# Patient Record
Sex: FEMALE | Race: WHITE | NOT HISPANIC OR LATINO
[De-identification: names, ages, dates, MRNs, and addresses within clinical notes are randomized per-mention and may not be internally consistent; named-entity substitution may affect disease eponyms.]

---

## 2019-03-04 ENCOUNTER — APPOINTMENT (OUTPATIENT)
Dept: OBGYN | Facility: CLINIC | Age: 44
End: 2019-03-04

## 2019-07-30 ENCOUNTER — APPOINTMENT (OUTPATIENT)
Dept: OBGYN | Facility: CLINIC | Age: 44
End: 2019-07-30
Payer: COMMERCIAL

## 2019-07-30 VITALS
BODY MASS INDEX: 24.07 KG/M2 | HEIGHT: 64 IN | SYSTOLIC BLOOD PRESSURE: 146 MMHG | WEIGHT: 141 LBS | DIASTOLIC BLOOD PRESSURE: 84 MMHG

## 2019-07-30 DIAGNOSIS — Z82.3 FAMILY HISTORY OF STROKE: ICD-10-CM

## 2019-07-30 DIAGNOSIS — Z12.31 ENCOUNTER FOR SCREENING MAMMOGRAM FOR MALIGNANT NEOPLASM OF BREAST: ICD-10-CM

## 2019-07-30 DIAGNOSIS — Z01.419 ENCOUNTER FOR GYNECOLOGICAL EXAMINATION (GENERAL) (ROUTINE) W/OUT ABNORMAL FINDINGS: ICD-10-CM

## 2019-07-30 DIAGNOSIS — Z78.9 OTHER SPECIFIED HEALTH STATUS: ICD-10-CM

## 2019-07-30 DIAGNOSIS — Z82.49 FAMILY HISTORY OF ISCHEMIC HEART DISEASE AND OTHER DISEASES OF THE CIRCULATORY SYSTEM: ICD-10-CM

## 2019-07-30 DIAGNOSIS — Z87.81 PERSONAL HISTORY OF (HEALED) TRAUMATIC FRACTURE: ICD-10-CM

## 2019-07-30 PROCEDURE — 99386 PREV VISIT NEW AGE 40-64: CPT

## 2019-07-30 NOTE — PHYSICAL EXAM
[Awake] : awake [Alert] : alert [Mass] : no breast mass [Acute Distress] : no acute distress [Axillary LAD] : no axillary lymphadenopathy [Soft] : soft [Nipple Discharge] : no nipple discharge [Tender] : non tender [Oriented x3] : oriented to person, place, and time [Normal] : uterus [No Bleeding] : there was no active vaginal bleeding [Uterine Adnexae] : were not tender and not enlarged [RRR, No Murmurs] : RRR, no murmurs [Occult Blood] : occult blood test from digital rectal exam was negative [CTAB] : CTAB [FreeTextEntry9] : small hemorrhoids

## 2019-08-02 LAB — HPV HIGH+LOW RISK DNA PNL CVX: DETECTED

## 2019-08-06 LAB — CYTOLOGY CVX/VAG DOC THIN PREP: ABNORMAL

## 2019-08-12 ENCOUNTER — FORM ENCOUNTER (OUTPATIENT)
Age: 44
End: 2019-08-12

## 2019-08-13 ENCOUNTER — OUTPATIENT (OUTPATIENT)
Dept: OUTPATIENT SERVICES | Facility: HOSPITAL | Age: 44
LOS: 1 days | End: 2019-08-13
Payer: COMMERCIAL

## 2019-08-13 ENCOUNTER — APPOINTMENT (OUTPATIENT)
Dept: MAMMOGRAPHY | Facility: CLINIC | Age: 44
End: 2019-08-13
Payer: COMMERCIAL

## 2019-08-13 DIAGNOSIS — Z90.721 ACQUIRED ABSENCE OF OVARIES, UNILATERAL: Chronic | ICD-10-CM

## 2019-08-13 DIAGNOSIS — Z12.31 ENCOUNTER FOR SCREENING MAMMOGRAM FOR MALIGNANT NEOPLASM OF BREAST: ICD-10-CM

## 2019-08-13 PROCEDURE — 77067 SCR MAMMO BI INCL CAD: CPT | Mod: 26

## 2019-08-13 PROCEDURE — 77063 BREAST TOMOSYNTHESIS BI: CPT | Mod: 26

## 2019-08-13 PROCEDURE — 77063 BREAST TOMOSYNTHESIS BI: CPT

## 2019-08-13 PROCEDURE — 77067 SCR MAMMO BI INCL CAD: CPT

## 2020-02-10 ENCOUNTER — APPOINTMENT (OUTPATIENT)
Dept: OBGYN | Facility: CLINIC | Age: 45
End: 2020-02-10
Payer: COMMERCIAL

## 2020-02-10 VITALS
HEIGHT: 64 IN | SYSTOLIC BLOOD PRESSURE: 123 MMHG | WEIGHT: 148 LBS | DIASTOLIC BLOOD PRESSURE: 82 MMHG | BODY MASS INDEX: 25.27 KG/M2

## 2020-02-10 DIAGNOSIS — N84.1 POLYP OF CERVIX UTERI: ICD-10-CM

## 2020-02-10 PROCEDURE — 57500 BIOPSY OF CERVIX: CPT

## 2020-02-10 PROCEDURE — 99213 OFFICE O/P EST LOW 20 MIN: CPT | Mod: 25

## 2020-02-10 NOTE — PHYSICAL EXAM
[Normal] : uterus [No Bleeding] : there was no active vaginal bleeding [Uterine Adnexae] : were not tender and not enlarged [Polyp ___ cm] : had a [unfilled] ~Ucm polyp

## 2020-02-11 LAB — HPV HIGH+LOW RISK DNA PNL CVX: NOT DETECTED

## 2020-02-13 LAB — CYTOLOGY CVX/VAG DOC THIN PREP: ABNORMAL

## 2020-02-26 LAB — CORE LAB BIOPSY: NORMAL

## 2021-03-16 ENCOUNTER — APPOINTMENT (OUTPATIENT)
Dept: OBGYN | Facility: CLINIC | Age: 46
End: 2021-03-16
Payer: COMMERCIAL

## 2021-03-16 VITALS
DIASTOLIC BLOOD PRESSURE: 60 MMHG | HEIGHT: 64 IN | BODY MASS INDEX: 25.27 KG/M2 | SYSTOLIC BLOOD PRESSURE: 110 MMHG | WEIGHT: 148 LBS

## 2021-03-16 PROCEDURE — 99396 PREV VISIT EST AGE 40-64: CPT

## 2021-03-16 PROCEDURE — 99072 ADDL SUPL MATRL&STAF TM PHE: CPT

## 2021-03-16 NOTE — PHYSICAL EXAM
[Awake] : awake [Alert] : alert [Acute Distress] : no acute distress [Mass] : no breast mass [Nipple Discharge] : no nipple discharge [Axillary LAD] : no axillary lymphadenopathy [Soft] : soft [Tender] : non tender [Oriented x3] : oriented to person, place, and time [Normal] : uterus [No Bleeding] : there was no active vaginal bleeding [Uterine Adnexae] : were not tender and not enlarged [RRR, No Murmurs] : RRR, no murmurs [CTAB] : CTAB [FreeTextEntry9] : small hemorrhoids

## 2021-03-19 LAB — HPV HIGH+LOW RISK DNA PNL CVX: DETECTED

## 2021-03-25 LAB — CYTOLOGY CVX/VAG DOC THIN PREP: ABNORMAL

## 2021-04-06 ENCOUNTER — RESULT CHARGE (OUTPATIENT)
Age: 46
End: 2021-04-06

## 2021-04-06 ENCOUNTER — APPOINTMENT (OUTPATIENT)
Dept: OBGYN | Facility: CLINIC | Age: 46
End: 2021-04-06
Payer: COMMERCIAL

## 2021-04-06 VITALS
SYSTOLIC BLOOD PRESSURE: 115 MMHG | WEIGHT: 148 LBS | HEIGHT: 64 IN | DIASTOLIC BLOOD PRESSURE: 74 MMHG | BODY MASS INDEX: 25.27 KG/M2

## 2021-04-06 PROCEDURE — 81025 URINE PREGNANCY TEST: CPT

## 2021-04-06 PROCEDURE — 99072 ADDL SUPL MATRL&STAF TM PHE: CPT

## 2021-04-06 PROCEDURE — 57454 BX/CURETT OF CERVIX W/SCOPE: CPT

## 2021-04-07 LAB
HCG UR QL: NEGATIVE
QUALITY CONTROL: YES

## 2021-04-07 NOTE — PROCEDURE
[Colposcopy] : Colposcopy  [Time out performed] : Pre-procedure time out performed.  Patient's name, date of birth and procedure confirmed. [Consent Obtained] : Consent obtained [Risks] : risks [Benefits] : benefits [Alternatives] : alternatives [Patient] : patient [Infection] : infection [Bleeding] : bleeding [Allergic Reaction] : allergic reaction [ASCUS] : ASCUS [Colposcopy Adequate] : colposcopy adequate [HPV High Risk] : HPV high risk [SCI Fully Visualized] : SCI fully visualized [ECC Performed] : ECC performed [Biopsy] : biopsy taken [Hemostasis Obtained] : Hemostasis obtained [Tolerated Well] : the patient tolerated the procedure well [de-identified] : 2 [de-identified] : 10,2. awe

## 2021-04-11 LAB — CORE LAB BIOPSY: NORMAL

## 2021-04-20 ENCOUNTER — APPOINTMENT (OUTPATIENT)
Dept: OBGYN | Facility: CLINIC | Age: 46
End: 2021-04-20
Payer: COMMERCIAL

## 2021-04-20 VITALS
HEART RATE: 59 BPM | BODY MASS INDEX: 24.92 KG/M2 | HEIGHT: 64 IN | DIASTOLIC BLOOD PRESSURE: 83 MMHG | SYSTOLIC BLOOD PRESSURE: 131 MMHG | WEIGHT: 146 LBS

## 2021-04-20 DIAGNOSIS — B97.7 PAPILLOMAVIRUS AS THE CAUSE OF DISEASES CLASSIFIED ELSEWHERE: ICD-10-CM

## 2021-04-20 PROCEDURE — 99072 ADDL SUPL MATRL&STAF TM PHE: CPT

## 2021-04-20 PROCEDURE — 56501 DESTROY VULVA LESIONS SIM: CPT

## 2021-04-20 NOTE — DISCUSSION/SUMMARY
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 dw pt trial of [facility]. \par tca applied to cervix. fu 1 yr given negative path.

## 2021-04-20 NOTE — HISTORY OF PRESENT ILLNESS
[FreeTextEntry1] : 47 yo pt here to fu colposcopy for ascus hrhpv+. path showed inflammation only. dw pt CC supplementation.

## 2021-11-03 ENCOUNTER — OUTPATIENT (OUTPATIENT)
Dept: OUTPATIENT SERVICES | Facility: HOSPITAL | Age: 46
LOS: 1 days | End: 2021-11-03
Payer: COMMERCIAL

## 2021-11-03 ENCOUNTER — RESULT REVIEW (OUTPATIENT)
Age: 46
End: 2021-11-03

## 2021-11-03 ENCOUNTER — APPOINTMENT (OUTPATIENT)
Dept: MAMMOGRAPHY | Facility: CLINIC | Age: 46
End: 2021-11-03
Payer: COMMERCIAL

## 2021-11-03 ENCOUNTER — TRANSCRIPTION ENCOUNTER (OUTPATIENT)
Age: 46
End: 2021-11-03

## 2021-11-03 DIAGNOSIS — Z12.31 ENCOUNTER FOR SCREENING MAMMOGRAM FOR MALIGNANT NEOPLASM OF BREAST: ICD-10-CM

## 2021-11-03 DIAGNOSIS — Z90.721 ACQUIRED ABSENCE OF OVARIES, UNILATERAL: Chronic | ICD-10-CM

## 2021-11-03 PROCEDURE — 77063 BREAST TOMOSYNTHESIS BI: CPT | Mod: 26

## 2021-11-03 PROCEDURE — 77067 SCR MAMMO BI INCL CAD: CPT | Mod: 26

## 2021-11-03 PROCEDURE — 77063 BREAST TOMOSYNTHESIS BI: CPT

## 2021-11-03 PROCEDURE — 77067 SCR MAMMO BI INCL CAD: CPT

## 2021-11-08 ENCOUNTER — RESULT REVIEW (OUTPATIENT)
Age: 46
End: 2021-11-08

## 2021-11-08 DIAGNOSIS — N64.89 OTHER SPECIFIED DISORDERS OF BREAST: ICD-10-CM

## 2021-12-03 ENCOUNTER — RESULT REVIEW (OUTPATIENT)
Age: 46
End: 2021-12-03

## 2021-12-03 ENCOUNTER — APPOINTMENT (OUTPATIENT)
Dept: MAMMOGRAPHY | Facility: CLINIC | Age: 46
End: 2021-12-03
Payer: COMMERCIAL

## 2021-12-03 ENCOUNTER — OUTPATIENT (OUTPATIENT)
Dept: OUTPATIENT SERVICES | Facility: HOSPITAL | Age: 46
LOS: 1 days | End: 2021-12-03
Payer: COMMERCIAL

## 2021-12-03 ENCOUNTER — APPOINTMENT (OUTPATIENT)
Dept: ULTRASOUND IMAGING | Facility: CLINIC | Age: 46
End: 2021-12-03
Payer: COMMERCIAL

## 2021-12-03 DIAGNOSIS — Z90.721 ACQUIRED ABSENCE OF OVARIES, UNILATERAL: Chronic | ICD-10-CM

## 2021-12-03 DIAGNOSIS — Z00.00 ENCOUNTER FOR GENERAL ADULT MEDICAL EXAMINATION WITHOUT ABNORMAL FINDINGS: ICD-10-CM

## 2021-12-03 DIAGNOSIS — N63.0 UNSPECIFIED LUMP IN UNSPECIFIED BREAST: ICD-10-CM

## 2021-12-03 PROCEDURE — 77065 DX MAMMO INCL CAD UNI: CPT | Mod: 26,RT

## 2021-12-03 PROCEDURE — G0279: CPT

## 2021-12-03 PROCEDURE — G0279: CPT | Mod: 26

## 2021-12-03 PROCEDURE — 76642 ULTRASOUND BREAST LIMITED: CPT | Mod: 26,RT

## 2021-12-03 PROCEDURE — 77065 DX MAMMO INCL CAD UNI: CPT

## 2021-12-03 PROCEDURE — 76642 ULTRASOUND BREAST LIMITED: CPT

## 2021-12-09 ENCOUNTER — OUTPATIENT (OUTPATIENT)
Dept: OUTPATIENT SERVICES | Facility: HOSPITAL | Age: 46
LOS: 1 days | End: 2021-12-09
Payer: COMMERCIAL

## 2021-12-09 ENCOUNTER — RESULT REVIEW (OUTPATIENT)
Age: 46
End: 2021-12-09

## 2021-12-09 ENCOUNTER — APPOINTMENT (OUTPATIENT)
Dept: ULTRASOUND IMAGING | Facility: CLINIC | Age: 46
End: 2021-12-09
Payer: COMMERCIAL

## 2021-12-09 DIAGNOSIS — Z00.8 ENCOUNTER FOR OTHER GENERAL EXAMINATION: ICD-10-CM

## 2021-12-09 DIAGNOSIS — Z90.721 ACQUIRED ABSENCE OF OVARIES, UNILATERAL: Chronic | ICD-10-CM

## 2021-12-09 PROCEDURE — 88305 TISSUE EXAM BY PATHOLOGIST: CPT | Mod: 26

## 2021-12-09 PROCEDURE — 19083 BX BREAST 1ST LESION US IMAG: CPT

## 2021-12-09 PROCEDURE — 77065 DX MAMMO INCL CAD UNI: CPT | Mod: 26,RT

## 2021-12-09 PROCEDURE — 19083 BX BREAST 1ST LESION US IMAG: CPT | Mod: RT

## 2021-12-09 PROCEDURE — A4648: CPT

## 2021-12-09 PROCEDURE — 77065 DX MAMMO INCL CAD UNI: CPT

## 2021-12-09 PROCEDURE — 88305 TISSUE EXAM BY PATHOLOGIST: CPT

## 2021-12-14 ENCOUNTER — NON-APPOINTMENT (OUTPATIENT)
Age: 46
End: 2021-12-14

## 2021-12-16 ENCOUNTER — APPOINTMENT (OUTPATIENT)
Dept: SURGERY | Facility: CLINIC | Age: 46
End: 2021-12-16
Payer: COMMERCIAL

## 2021-12-16 VITALS
BODY MASS INDEX: 32.86 KG/M2 | HEIGHT: 55 IN | OXYGEN SATURATION: 98 % | DIASTOLIC BLOOD PRESSURE: 88 MMHG | TEMPERATURE: 98.1 F | SYSTOLIC BLOOD PRESSURE: 160 MMHG | HEART RATE: 50 BPM | WEIGHT: 142 LBS

## 2021-12-16 PROCEDURE — 99205 OFFICE O/P NEW HI 60 MIN: CPT

## 2021-12-16 NOTE — ASSESSMENT
[FreeTextEntry1] : 47 yo presents with newly diagnosed 1.7 cm IDC grade 2 ER 90% FL >90% Her2 negative.  Recommendation for \par 1.  Follow up in 2 weeks\par 2.  MR breast \par 3.  Consult with medical oncology\par 4.  Consult with plastics\par 5.  Consult with radiation oncology\par 6.  Follow up genetics

## 2021-12-16 NOTE — PHYSICAL EXAM
[Normocephalic] : normocephalic [Atraumatic] : atraumatic [EOMI] : extra ocular movement intact [PERRL] : pupils equal, round and reactive to light [Sclera nonicteric] : sclera nonicteric [Supple] : supple [No Supraclavicular Adenopathy] : no supraclavicular adenopathy [Examined in the supine and seated position] : examined in the supine and seated position [No dominant masses] : no dominant masses in right breast  [No dominant masses] : no dominant masses left breast [No Nipple Retraction] : no left nipple retraction [No Nipple Discharge] : no left nipple discharge [Breast Nipple Inversion] : nipples not inverted [Breast Nipple Retraction] : nipples not retracted [Breast Nipple Flattening] : nipples not flattened [Breast Nipple Fissures] : nipples not fissured [Breast Abnormal Lactation (Galactorrhea)] : no galactorrhea [Breast Abnormal Secretion Bloody Fluid] : no bloody discharge [Breast Abnormal Secretion Serous Fluid] : no serous discharge [Breast Abnormal Secretion Opalescent Fluid] : no milky discharge [No Axillary Lymphadenopathy] : no left axillary lymphadenopathy [No Edema] : no edema [No Rashes] : no rashes [No Ulceration] : no ulceration [de-identified] : No supraclavicular or axillary adenopathy. No dominant masses, normal to palpation. Everted nipple without discharge. No skin changes.\par  [de-identified] : No supraclavicular or axillary adenopathy. No dominant masses, normal to palpation. Everted nipple without discharge. No skin changes.\par

## 2021-12-16 NOTE — HISTORY OF PRESENT ILLNESS
[FreeTextEntry1] : I had the pleasure of seeing BERNIE IVEY  in the office today for a new breast evaluation.\par \par Bernie was diagnsoed with right breast IDC grade 2 ER 90% UT >90% Her2 negative.  Screening imaging demonstrated a new questionable asymmetry in the right breast.  Call back mammogram recommended for biopsy of the 1.7 cm nodule.  Biopsy was performed on 12/9/2021 demonstrating IDC grade 2 ER 90% UT >90% Her2 negative,\par \par She denies dominant breast mass, skin changes or nipple discharge. She denies headaches, blurry vision, chest pain, SOB, abdominal pain, joint aches or difficult walking.\par \par 11/3/2021  [facility] mammo screen w gigi BI\par Questionable new asymmetry upper outer posterior right breast. Further evaluation with additional diagnostic mammographic views and ultrasound recommended.  RECOMMENDATION: Additional Imaging.\par BI-RADS 0 - Incomplete: Needs Additional Imaging Evaluation\par \par 12/3/2021  US breast limited RT/ MG mammo diag w gigi RT\par Suspicious mass right breast. Ultrasound guided core needle biopsy is recommended for further evaluation, and will be scheduled.\par Susana in the office of Dr. Velazquez was notified of these results on 12/3/2021\par RECOMMENDATION: Ultrasound biopsy. BI-RADS 5- Highly Suggestive for Malignancy\par \par 12/9/2021  Pathology\par Right breast at 10:00 o'clock, 6 cmfn from nipple, needle core biopsy:  Invasive mammary duct carcinoma, intermediate [place] modified histological grade 6/9 ( T=3, N=2, M=1), measuring 1.0 cm in biopsy length.  Ductal carcinoma in situ, is not present.  ER 90% UT >90% Her2 negative\par \par We reviewed and discussed current diagnosis of right breast cancer.  She understands that invasive breast cancer treatment options include surgery, radiation and/or chemotherapy/hormonal therapy.  \par \par We discussed surgical options including breast conservation with right wide lumpectomy and sentinel lymph node biopsy possible axillary dissection and the need for a tracer that would be injected into the breast; we discussed the possible options for the tracer of blue dye and/or nuclear medicine radiotracer, or another form such as magtrace to identify the sentinel nodes in a procedure termed "mapping".  We also discussed the option of right mastectomy with sentinel lymph node biopsy possible axillary dissection and the need for tracer to perform "mapping".  We discussed the rate of lymphedema with sentinel lymph node biopsy of approximately 7-10 % and 25-30% with axillary dissection.  We also discussed that in less than 2% of cases, mapping fails, requiring axillary dissection at the time of surgery. Furthermore, we discussed Z11 protocol for any individual undergoing breast conservation with a hormone positive tumor <5cm, sentinel lymph node biopsy will be performed and findings will be reported on final pathology to avoid reported micrometastasis, or isolated tumor cells as a positive node intra-operatively that would not necessitate completion axillary dissection.\par \par In terms of the differences between breast conservation or mastectomy, we reviewed that overall survival is equivalent, and locoregional recurrence is higher with breast conservation.  She understands that since locoregional recurrence is higher with breast conservation, radiation is recommended as standard of care for every patient <70 years old.  We discussed radiation as high energy directed toward the breast, generally Mon-Fri for 6.5 weeks unless a different duration is discussed with Radiation Oncology. We discussed risks v benefits of radiation and usually benign able to omit radiation with mastectomy unless in such cases as inflammatory breast cancer, positive margins after mastectomy, tumor >4cm. A separate consultation has been recommended with our multidisciplinary radiation partners to discuss further.\par \par We discussed the technical aspects of each surgical procedure in addition to technical aspects of radiation.  We reviewed risks involved with radiation including but not limited to infection, hyperpigmentation, angiosarcoma, radiation induced carditis/cardiomyopathy, and injury to the lung.  We also discussed breast lymphedema and radiation induced changes of the skin/tissue.\par \par We also discussed the difference between the biology of cancer, and reviewed the particular biology identified on pathology and the significance in terms of treatment. We discussed Oncotype/recurrence testing and need for chemotherapy for hormone positive cancers and the timing of systemic treatment prior to radiation treatment. \par \par \par We also discussed options of reconstruction.  Plastic Surgery consultation is recommended, in addition to consult with radiation oncology. \par \par We offered assistance in scheduling all multi-disciplinary appointments and imaging. We also assist in scheduling all appointments needed for medical clearance and presurgical testing. \par \par We discussed the benefit of the  who assists patients within the Cancer Center, as well as coalition support. We have also discussed nutritional support, genetic counseling and physical therapy services we offer.\par \par \par She understands the plan and all questions were answered.\par

## 2021-12-20 ENCOUNTER — APPOINTMENT (OUTPATIENT)
Dept: MRI IMAGING | Facility: CLINIC | Age: 46
End: 2021-12-20
Payer: COMMERCIAL

## 2021-12-20 ENCOUNTER — RESULT REVIEW (OUTPATIENT)
Age: 46
End: 2021-12-20

## 2021-12-20 ENCOUNTER — OUTPATIENT (OUTPATIENT)
Dept: OUTPATIENT SERVICES | Facility: HOSPITAL | Age: 46
LOS: 1 days | End: 2021-12-20
Payer: COMMERCIAL

## 2021-12-20 ENCOUNTER — APPOINTMENT (OUTPATIENT)
Dept: PLASTIC SURGERY | Facility: CLINIC | Age: 46
End: 2021-12-20
Payer: COMMERCIAL

## 2021-12-20 VITALS — BODY MASS INDEX: 24.75 KG/M2 | HEIGHT: 64 IN | WEIGHT: 145 LBS

## 2021-12-20 DIAGNOSIS — C50.911 MALIGNANT NEOPLASM OF UNSPECIFIED SITE OF RIGHT FEMALE BREAST: ICD-10-CM

## 2021-12-20 DIAGNOSIS — Z90.721 ACQUIRED ABSENCE OF OVARIES, UNILATERAL: Chronic | ICD-10-CM

## 2021-12-20 DIAGNOSIS — Z80.3 FAMILY HISTORY OF MALIGNANT NEOPLASM OF BREAST: ICD-10-CM

## 2021-12-20 PROCEDURE — C8908: CPT

## 2021-12-20 PROCEDURE — A9585: CPT

## 2021-12-20 PROCEDURE — C8937: CPT

## 2021-12-20 PROCEDURE — 99205 OFFICE O/P NEW HI 60 MIN: CPT

## 2021-12-20 PROCEDURE — 77049 MRI BREAST C-+ W/CAD BI: CPT | Mod: 26

## 2021-12-21 ENCOUNTER — APPOINTMENT (OUTPATIENT)
Dept: RADIATION ONCOLOGY | Facility: CLINIC | Age: 46
End: 2021-12-21
Payer: COMMERCIAL

## 2021-12-21 VITALS
OXYGEN SATURATION: 99 % | WEIGHT: 143 LBS | TEMPERATURE: 97 F | SYSTOLIC BLOOD PRESSURE: 140 MMHG | HEIGHT: 64 IN | HEART RATE: 59 BPM | BODY MASS INDEX: 24.41 KG/M2 | RESPIRATION RATE: 16 BRPM | DIASTOLIC BLOOD PRESSURE: 70 MMHG

## 2021-12-21 VITALS — HEIGHT: 64 IN

## 2021-12-21 PROCEDURE — 99205 OFFICE O/P NEW HI 60 MIN: CPT | Mod: 25

## 2021-12-21 NOTE — DISEASE MANAGEMENT
[Clinical] : TNM Stage: c [IA] : IA [FreeTextEntry4] : right breast IDC, grade 2, ER+/UT+, HER2- [TTNM] : 1c [NTNM] : 0 [MTNM] : 0

## 2021-12-21 NOTE — LETTER CLOSING
[Consult Closing:] : Thank you for allowing me to participate in the care of this patient.  If you have any questions, please do not hesitate to contact me. [Sincerely yours,] : Sincerely yours, [FreeTextEntry3] : Hermann Menon MD\par  of Radiation Medicine, Quality and Safety\par  of Radiation Medicine\par [facility]\par [facility]\par [facility]\par

## 2021-12-21 NOTE — HISTORY OF PRESENT ILLNESS
[FreeTextEntry1] : 46-year-old woman presents today for consultation regarding radiation therapy for breast cancer.  \par \par She undergoes routine breast imaging, and was noted on 11/3/21 bilateral screening mammogram to have questionable new asymmetry upper outer posterior right breast. BI-RADS 0.\par \par 12/30/21 diagnostic right mammogram and targeted breast ultrasound noted a persistent asymmetry in the right breast UOQ, with corresponding 1.7 cm irregular hypoechoic mass at 10:00 6cm FN.\par \par 12/9/21 ultrasound guided needle core biopsy showed invasive mammary duct carcinoma, grade 2, ER 90%, DE >90%, HER2 negative.  DCIS was not present.  No LVI identified.\par \par She met with Dr. Packer on 12/16/21, and with Dr. Duncan on 12/20/21.\par \par She underwent MRI breast on 12/20/21, report pending.\par \par Denies pain, edema, itch, nipple discharge, arm edema, fatigue or unintentional weight loss.

## 2021-12-21 NOTE — VITALS
[Maximal Pain Intensity: 0/10] : 0/10 [Least Pain Intensity: 0/10] : 0/10 [90: Able to carry normal activity; minor signs or symptoms of disease.] : 90: Able to carry normal activity; minor signs or symptoms of disease.  [Date: ____________] : Patient's last distress assessment performed on [unfilled]. [10 - Extreme Distress] : Distress Level: 10 [Patient given social work contact information and resource sheet] : Patient was given social work contact information and resource sheet [FreeTextEntry7] : did not want to speak to social work

## 2021-12-21 NOTE — REVIEW OF SYSTEMS
[Negative] : Allergic/Immunologic [Edema Limbs: Grade 0] : Edema Limbs: Grade 0  [Fatigue: Grade 0] : Fatigue: Grade 0 [Localized Edema: Grade 0] : Localized Edema: Grade 0  [Neck Edema: Grade 0] : Neck Edema: Grade 0 [Dyspareunia: Grade 0] : Dyspareunia: Grade 0

## 2021-12-21 NOTE — PHYSICAL EXAM
[Sclera] : the sclera and conjunctiva were normal [Extraocular Movements] : extraocular movements were intact [Outer Ear] : the ears and nose were normal in appearance [No UE Edema] : there is no upper extremity edema [Bowel Sounds] : normal bowel sounds [Abdomen Soft] : soft [Nondistended] : nondistended [Abdomen Tenderness] : non-tender [Cervical Lymph Nodes Enlarged Anterior Bilaterally] : anterior cervical [Supraclavicular Lymph Nodes Enlarged Bilaterally] : supraclavicular [Axillary Lymph Nodes Enlarged Bilaterally] : axillary [Range of Motion to Joints] : range of motion to joints [Motor Tone] : muscle strength and tone were normal [Skin Lesions] : no skin lesions [Normal] : no focal deficits [Motor Exam] : the motor exam was normal [de-identified] : post-biopsy ecchymosis

## 2021-12-29 ENCOUNTER — OUTPATIENT (OUTPATIENT)
Dept: OUTPATIENT SERVICES | Facility: HOSPITAL | Age: 46
LOS: 1 days | Discharge: HOME | End: 2021-12-29

## 2021-12-29 ENCOUNTER — NON-APPOINTMENT (OUTPATIENT)
Age: 46
End: 2021-12-29

## 2021-12-29 ENCOUNTER — APPOINTMENT (OUTPATIENT)
Dept: HEMATOLOGY ONCOLOGY | Facility: CLINIC | Age: 46
End: 2021-12-29
Payer: COMMERCIAL

## 2021-12-29 VITALS
BODY MASS INDEX: 24.59 KG/M2 | OXYGEN SATURATION: 98 % | HEART RATE: 49 BPM | WEIGHT: 144 LBS | SYSTOLIC BLOOD PRESSURE: 147 MMHG | DIASTOLIC BLOOD PRESSURE: 86 MMHG | HEIGHT: 64 IN

## 2021-12-29 DIAGNOSIS — C50.919 MALIGNANT NEOPLASM OF UNSPECIFIED SITE OF UNSPECIFIED FEMALE BREAST: ICD-10-CM

## 2021-12-29 DIAGNOSIS — Z90.721 ACQUIRED ABSENCE OF OVARIES, UNILATERAL: Chronic | ICD-10-CM

## 2021-12-29 PROCEDURE — 99205 OFFICE O/P NEW HI 60 MIN: CPT

## 2021-12-29 NOTE — CONSULT LETTER
[Dear  ___] : Dear  [unfilled], [Consult Letter:] : I had the pleasure of evaluating your patient, [unfilled]. [Please see my note below.] : Please see my note below. [Consult Closing:] : Thank you very much for allowing me to participate in the care of this patient.  If you have any questions, please do not hesitate to contact me. [Sincerely,] : Sincerely, [DrSylvester  ___] : Dr. HATCH [FreeTextEntry3] : Diane Rubio MD\par Medical Oncology/Hematology\par [facility] [place], [facility]\par \par \par [facility]\par

## 2021-12-29 NOTE — HISTORY OF PRESENT ILLNESS
[de-identified] : Ms. Harrington was diagnosed with breast cancer at age 46 in December 2021.\par \par She had a screening mammogram on 11/3/21 which showed a questionable new asymmetry  upper outer posterior right breast. \par Subsequent diagnostic mammogram/sonogram on 12/3/21 showed: 1.2 x 1.2 x 1.7 cm irregular mass at 10:00 right breast, 6 cmfn. No axillary adenopathy.\par \par On 12/9/21 right breast 10:00, 6 cmfn core biposy - IDC, grade 6/9, 1 cm in length. ER 90%, AK>90%, HER2 negative. \par \par On 12/20/21 MRI breast -Marked background parenchymal enhancement significantly lowers the sensitivity of breast MRI for detection of small enhancing lesions.\par A 2.1 cm biopsy-proven malignancy in the posterior 10:00 RIGHT breast. Additionally, there is suspicious asymmetric nonmass enhancement involving most of the upper right breast including the site of biopsy-proven malignancy in the upper outer breast and the upper inner quadrant. MR guided biopsy of a representative region in the upper inner quadrant is recommended ([id number]).\par No lymphadenopathy and no MR evidence of malignancy in the contralateral breast.\par \par PMHx: Denies \par FMHx: Maternal Great Aunt breast Cancer, Father prostate cancer dx early 60's \par Sx: Cystectomy  \par Socx: Social Alcohol Use, Non-smoker \par PCP: Dr. Yung Beverly in [place] \par \par Planning on b/l mastectomy\par LMS 12/26/2021\par GYN Dr. Bass, pap smear - 04/2021 \par Notes she has 3 children.

## 2021-12-29 NOTE — PHYSICAL EXAM
[Normal] : affect appropriate [de-identified] : supple [de-identified] : c [de-identified] : no palpable R breast mass or axillary adenopathy. +ecchymosis at site of biopsy R breast.

## 2021-12-29 NOTE — ASSESSMENT
[FreeTextEntry1] : 46 year old premenopausal female with right breast invasive ductal carcinoma, ER 90%, MS>90%, HER2 negative. \par MRI breast shows 2.1 cm 10:00 R breast mass and asymmetric nonmass enhancement in involving site of disease and upper inner quadrant.\par \par Today we discussed the available radiographic and pathologic data in detail.  We also discussed the natural history of the disease, as well as management options. Discussed role of on oncotype Dx in guiding decisions about adjuvant chemotherapy.   Also discussed role of adjuvant endocrine therapy in reducing risk of distant recurrence. Briefly discussed Tamoxifen vs. OS+AI if higher risk disease.  She is considering a bilateral mastectomy. \par \par She will return for follow up 3-4 weeks after surgery to finalize a treatment plan.

## 2021-12-29 NOTE — ADDENDUM
[FreeTextEntry1] : Documented by Vini Granados acting as scribe for Dr. Rubio on 12/29/2021. \par \par All Medical record entries made by the Scribe were at my, Dr. Rubio's, direction and personally dictated by me on 12/29/2021. I have reviewed the chart and agree that the record accurately reflects my personal performance of the history, physical exam, assessment and plan. I have also personally directed, reviewed, and agreed with the discharge instructions.

## 2021-12-30 ENCOUNTER — APPOINTMENT (OUTPATIENT)
Dept: SURGERY | Facility: CLINIC | Age: 46
End: 2021-12-30
Payer: COMMERCIAL

## 2021-12-30 ENCOUNTER — APPOINTMENT (OUTPATIENT)
Dept: MRI IMAGING | Facility: CLINIC | Age: 46
End: 2021-12-30
Payer: COMMERCIAL

## 2021-12-30 VITALS
HEART RATE: 48 BPM | WEIGHT: 144 LBS | OXYGEN SATURATION: 98 % | BODY MASS INDEX: 24.59 KG/M2 | SYSTOLIC BLOOD PRESSURE: 147 MMHG | DIASTOLIC BLOOD PRESSURE: 86 MMHG | HEIGHT: 64 IN | TEMPERATURE: 98 F

## 2021-12-30 PROCEDURE — 99215 OFFICE O/P EST HI 40 MIN: CPT

## 2021-12-30 PROCEDURE — 74185 MRA ABD W OR W/O CNTRST: CPT

## 2021-12-30 PROCEDURE — A9585: CPT | Mod: NC

## 2021-12-30 PROCEDURE — 72198 MR ANGIO PELVIS W/O & W/DYE: CPT

## 2021-12-30 NOTE — PHYSICAL EXAM
[Normocephalic] : normocephalic [Atraumatic] : atraumatic [EOMI] : extra ocular movement intact [PERRL] : pupils equal, round and reactive to light [Sclera nonicteric] : sclera nonicteric [Supple] : supple [No Supraclavicular Adenopathy] : no supraclavicular adenopathy [Examined in the supine and seated position] : examined in the supine and seated position [No dominant masses] : no dominant masses in right breast  [No dominant masses] : no dominant masses left breast [No Nipple Retraction] : no left nipple retraction [No Nipple Discharge] : no left nipple discharge [Breast Nipple Inversion] : nipples not inverted [Breast Nipple Retraction] : nipples not retracted [Breast Nipple Flattening] : nipples not flattened [Breast Nipple Fissures] : nipples not fissured [Breast Abnormal Lactation (Galactorrhea)] : no galactorrhea [Breast Abnormal Secretion Bloody Fluid] : no bloody discharge [Breast Abnormal Secretion Serous Fluid] : no serous discharge [Breast Abnormal Secretion Opalescent Fluid] : no milky discharge [No Axillary Lymphadenopathy] : no left axillary lymphadenopathy [No Edema] : no edema [No Rashes] : no rashes [No Ulceration] : no ulceration [de-identified] : No supraclavicular or axillary adenopathy. No dominant masses, normal to palpation. Everted nipple without discharge. No skin changes.\par  [de-identified] : No supraclavicular or axillary adenopathy. No dominant masses, normal to palpation. Everted nipple without discharge. No skin changes.\par

## 2021-12-30 NOTE — HISTORY OF PRESENT ILLNESS
[FreeTextEntry1] : I had the pleasure of seeing BERNIE IVEY  in the office today to discuss surgical planning.\par \par Bernie was diagnsoed with right breast IDC grade 2 ER 90% DE >90% Her2 negative.  Screening imaging demonstrated a new questionable asymmetry in the right breast.  Call back mammogram recommended for biopsy of the 1.7 cm nodule.  Biopsy was performed on 12/9/2021 demonstrating IDC grade 2 ER 90% DE >90% Her2 negative.\par \par She denies dominant breast mass, skin changes or nipple discharge. She denies headaches, blurry vision, chest pain, SOB, abdominal pain, joint aches or difficult walking.\par \par 11/3/2021  [facility] mammo screen w gigi BI\par Questionable new asymmetry upper outer posterior right breast. Further evaluation with additional diagnostic mammographic views and ultrasound recommended.  RECOMMENDATION: Additional Imaging.\par BI-RADS 0 - Incomplete: Needs Additional Imaging Evaluation\par \par 12/3/2021  US breast limited RT/ MG mammo diag w gigi RT\par Suspicious mass right breast. Ultrasound guided core needle biopsy is recommended for further evaluation, and will be scheduled.\par Susana in the office of Dr. Velazquez was notified of these results on 12/3/2021\par RECOMMENDATION: Ultrasound biopsy. BI-RADS 5- Highly Suggestive for Malignancy\par \par 12/9/2021  Pathology\par Right breast at 10:00 o'clock, 6 cmfn from nipple, needle core biopsy:  Invasive mammary duct carcinoma, intermediate [place] modified histological grade 6/9 ( T=3, N=2, M=1), measuring 1.0 cm in biopsy length.  Ductal carcinoma in situ, is not present.  ER 90% DE >90% Her2 negative\par \par 12/21/2021  MR breast \par Marked background parenchymal enhancement significantly lowers the sensitivity of breast MRI for detection of small enhancing lesions.\par A 2.1 cm biopsy-proven malignancy in the posterior 10:00 RIGHT breast. Additionally, there is suspicious asymmetric nonmass enhancement involving most of the upper right breast including the site of biopsy-proven malignancy in the upper outer breast and the upper inner quadrant. MR guided biopsy of a representative region in the upper inner quadrant is recommended ([id number]).\par No lymphadenopathy and no MR evidence of malignancy in the contralateral breast.\par RECOMMENDATION: MR guided biopsy.\par BI-RADS 4B - Suspicious Finding(s) - Moderate Suspicion for Malignancy\par \par We reviewed and discussed MR breast and she understands she does not need to undergo additional biopsy since it will not alter her management since she is undergoing bilateral nipple/skin sparing mastectomy with SLNB possible AND.  I reviewed her genetics and her genetics are negative for mutation.  Plan for bilateral nipple/skin sparing mastectomy with SLND possible AND.\par \par She understand and agrees to plan.  All questions answered.\par

## 2022-01-26 ENCOUNTER — OUTPATIENT (OUTPATIENT)
Dept: OUTPATIENT SERVICES | Facility: HOSPITAL | Age: 47
LOS: 1 days | End: 2022-01-26
Payer: COMMERCIAL

## 2022-01-26 VITALS
WEIGHT: 140.21 LBS | HEIGHT: 64 IN | RESPIRATION RATE: 16 BRPM | TEMPERATURE: 98 F | OXYGEN SATURATION: 100 % | HEART RATE: 55 BPM | DIASTOLIC BLOOD PRESSURE: 84 MMHG | SYSTOLIC BLOOD PRESSURE: 131 MMHG

## 2022-01-26 DIAGNOSIS — C50.911 MALIGNANT NEOPLASM OF UNSPECIFIED SITE OF RIGHT FEMALE BREAST: ICD-10-CM

## 2022-01-26 DIAGNOSIS — Z90.721 ACQUIRED ABSENCE OF OVARIES, UNILATERAL: Chronic | ICD-10-CM

## 2022-01-26 LAB
ANION GAP SERPL CALC-SCNC: 5 MMOL/L — SIGNIFICANT CHANGE UP (ref 5–17)
BLD GP AB SCN SERPL QL: SIGNIFICANT CHANGE UP
BUN SERPL-MCNC: 12 MG/DL — SIGNIFICANT CHANGE UP (ref 7–23)
CALCIUM SERPL-MCNC: 8.8 MG/DL — SIGNIFICANT CHANGE UP (ref 8.4–10.5)
CHLORIDE SERPL-SCNC: 105 MMOL/L — SIGNIFICANT CHANGE UP (ref 96–108)
CO2 SERPL-SCNC: 30 MMOL/L — SIGNIFICANT CHANGE UP (ref 22–31)
CREAT SERPL-MCNC: 0.84 MG/DL — SIGNIFICANT CHANGE UP (ref 0.5–1.3)
GLUCOSE SERPL-MCNC: 97 MG/DL — SIGNIFICANT CHANGE UP (ref 70–99)
HCT VFR BLD CALC: 38.6 % — SIGNIFICANT CHANGE UP (ref 34.5–45)
HGB BLD-MCNC: 12.8 G/DL — SIGNIFICANT CHANGE UP (ref 11.5–15.5)
MCHC RBC-ENTMCNC: 30.3 PG — SIGNIFICANT CHANGE UP (ref 27–34)
MCHC RBC-ENTMCNC: 33.2 GM/DL — SIGNIFICANT CHANGE UP (ref 32–36)
MCV RBC AUTO: 91.5 FL — SIGNIFICANT CHANGE UP (ref 80–100)
NRBC # BLD: 0 /100 WBCS — SIGNIFICANT CHANGE UP (ref 0–0)
PLATELET # BLD AUTO: 231 K/UL — SIGNIFICANT CHANGE UP (ref 150–400)
POTASSIUM SERPL-MCNC: 3.9 MMOL/L — SIGNIFICANT CHANGE UP (ref 3.5–5.3)
POTASSIUM SERPL-SCNC: 3.9 MMOL/L — SIGNIFICANT CHANGE UP (ref 3.5–5.3)
RBC # BLD: 4.22 M/UL — SIGNIFICANT CHANGE UP (ref 3.8–5.2)
RBC # FLD: 13.3 % — SIGNIFICANT CHANGE UP (ref 10.3–14.5)
SODIUM SERPL-SCNC: 140 MMOL/L — SIGNIFICANT CHANGE UP (ref 135–145)
WBC # BLD: 6.55 K/UL — SIGNIFICANT CHANGE UP (ref 3.8–10.5)
WBC # FLD AUTO: 6.55 K/UL — SIGNIFICANT CHANGE UP (ref 3.8–10.5)

## 2022-01-26 NOTE — H&P PST ADULT - NSICDXFAMILYHX_GEN_ALL_CORE_FT
FAMILY HISTORY:  Father  Still living? Yes, Estimated age: 61-70  Family history of acute myocardial infarction, Age at diagnosis: 51-60  Family history of prostate cancer, Age at diagnosis: Age Unknown    Mother  Still living? Unknown  FH: HTN (hypertension), Age at diagnosis: Age Unknown    Sibling  Still living? Yes, Estimated age: Age Unknown  FH: HTN (hypertension), Age at diagnosis: Age Unknown

## 2022-01-26 NOTE — H&P PST ADULT - NS PRO OT REFERRAL QUES 1 YN
Pt is calling stating that she has Lyme disease - she had some labs done at the  [facility] and has been changed to different medications - pt would like to establish care with you     Pt's  has been seeing you and would like to know if you would accept her also - pt is very concerned about having Lyme Disease and would like to get in as soon as she can - pt's husbands name is Justin (Addi) Laverne     Please advise         no

## 2022-01-26 NOTE — H&P PST ADULT - MEDICATION ADMINISTRATION INFO, PROFILE
Past Medical History:   Diagnosis Date    Hypertension      Past Surgical History:   Procedure Laterality Date    CATARACT EXTRACTION W/  INTRAOCULAR LENS IMPLANT Right 12/12/2019    Procedure: EXTRACTION, CATARACT, WITH IOL INSERTION;  Surgeon: Alcides Holcomb MD;  Location: [facility];  Service: Ophthalmology;  Laterality: Right;    PHACOEMULSIFICATION OF CATARACT Right 12/12/2019    Procedure: PHACOEMULSIFICATION, CATARACT;  Surgeon: Alcides Holcomb MD;  Location: [facility];  Service: Ophthalmology;  Laterality: Right;      Current Facility-Administered Medications on File Prior to Encounter   Medication Dose Route Frequency Provider Last Rate Last Dose    [COMPLETED] 0.9%  NaCl infusion   Intravenous Once Kassandra Beltrán MD 1,000 mL/hr at 08/04/20 0522 500 mL at 08/04/20 0522    [COMPLETED] lactated ringers bolus 1,000 mL  1,000 mL Intravenous Once Kassandra Beltrán MD   1,000 mL at 08/04/20 1147    [COMPLETED] vancomycin 750 mg in dextrose 5 % 250 mL IVPB (ready to mix system)  750 mg Intravenous Once Heriberto Tong  mL/hr at 08/04/20 0929 750 mg at 08/04/20 0929    [DISCONTINUED] 0.9%  NaCl infusion (for blood administration)   Intravenous Q24H PRN Kassandra Beltrán MD        [DISCONTINUED] 0.9%  NaCl infusion   Intravenous Once Kassandra Beltrán MD        [DISCONTINUED] acetaminophen tablet 650 mg  650 mg Oral Q4H PRN Cecy Zafar MD   650 mg at 08/04/20 0120    [DISCONTINUED] argatroban in sodium chloride 0.9% (conc: 1 mg/mL)  0.5 mcg/kg/min Intravenous Continuous Ernestina Chatterjee MD 2.3 mL/hr at 08/04/20 1409 0.5 mcg/kg/min at 08/04/20 1409    [DISCONTINUED] chlorhexidine 0.12 % solution 15 mL  15 mL Mouth/Throat BID Wolf Angel MD   15 mL at 08/04/20 0929    [DISCONTINUED] dexamethasone (DECADRON) 6 mg in sodium chloride 0.9% IVPB   Intravenous Daily Ernestina Chatterjee  mL/hr at 08/04/20 1738      [DISCONTINUED] dexamethasone injection 6 mg  6 mg Intravenous Q24H Ernestina Chatterjee MD         [DISCONTINUED] dextrose 50% injection 12.5 g  12.5 g Intravenous PRN Wolf Angel MD        [DISCONTINUED] erythromycin 5 mg/gram (0.5 %) ophthalmic ointment   Both Eyes Q6H Cecy Zafar MD        [DISCONTINUED] fludrocortisone (FLORINEF) split tablet 50 mcg  50 mcg Per NG tube Daily Wolf Angel MD   50 mcg at 08/04/20 0930    [DISCONTINUED] folic acid tablet 1 mg  1 mg Per NG tube Daily Wolf Angel MD   1 mg at 08/04/20 0929    [DISCONTINUED] glucagon (human recombinant) injection 1 mg  1 mg Intramuscular PRN Wolf Angel MD        [DISCONTINUED] hydrocortisone sodium succinate injection 50 mg  50 mg Intravenous Q6H Wolf Angel MD   50 mg at 08/04/20 1156    [DISCONTINUED] insulin aspart U-100 pen 0-5 Units  0-5 Units Subcutaneous Q4H PRN Wolf Angel MD        [DISCONTINUED] levETIRAcetam in NaCl (iso-os) IVPB 500 mg  500 mg Intravenous Q12H Heriberto Tong  mL/hr at 08/04/20 0929 500 mg at 08/04/20 0929    [DISCONTINUED] norepinephrine 4 mg in dextrose 5 % 250 mL infusion  0.1 mcg/kg/min Intravenous Continuous Heriberto Tong MD 22.8 mL/hr at 08/04/20 1750 0.08 mcg/kg/min at 08/04/20 1750    [DISCONTINUED] norepinephrine 4 mg in dextrose 5% 250 mL infusion (premix) (titrating)  0.02 mcg/kg/min Intravenous Continuous Kassandra Beltrán MD 28.5 mL/hr at 08/04/20 1407 0.1 mcg/kg/min at 08/04/20 1407    [DISCONTINUED] pantoprazole injection 40 mg  40 mg Intravenous BID Wolf Angel MD   40 mg at 08/04/20 0929    [DISCONTINUED] piperacillin-tazobactam 4.5 g in dextrose 5 % 100 mL IVPB (ready to mix system)  4.5 g Intravenous Q12H Kassandra Beltrán MD 25 mL/hr at 08/04/20 1156 4.5 g at 08/04/20 1156    [DISCONTINUED] polyvinyl alcohol (artificial tears) 1.4 % ophthalmic solution 2 drop  2 drop Both Eyes QHS Cecy Zafar MD   2 drop at 08/03/20 2022    [DISCONTINUED] promethazine (PHENERGAN) 6.25 mg in dextrose 5 % 50 mL IVPB  6.25 mg Intravenous Q6H PRN Cecy Zafar,  MD        [DISCONTINUED] propofol (DIPRIVAN) 10 mg/mL infusion  5 mcg/kg/min Intravenous Continuous Kassandra Beltrán MD 6.8 mL/hr at 08/04/20 0650 15 mcg/kg/min at 08/04/20 0650    [DISCONTINUED] sodium chloride 0.9% flush 10 mL  10 mL Intravenous PRN Cecy Zafar MD        [DISCONTINUED] thiamine (B-1) 100 mg in dextrose 5 % 50 mL IVPB  100 mg Intravenous Daily Kassandra Beltrán MD 12.5 mL/hr at 08/04/20 0951 100 mg at 08/04/20 0951    [DISCONTINUED] vancomycin - pharmacy to dose   Intravenous pharmacy to manage frequency Kassandra Beltrán MD         Current Outpatient Medications on File Prior to Encounter   Medication Sig Dispense Refill    amlodipine-benazepril 10-20mg (LOTREL) 10-20 mg per capsule Take 1 capsule by mouth once daily.      CIPROFLOXACIN HCL OPHT Apply to eye 4 (four) times daily.      difluprednate (DUREZOL) 0.05 % Drop ophthalmic solution 1 drop.      hydrOXYzine pamoate (VISTARIL) 25 MG Cap Take 1 capsule (25 mg total) by mouth every 8 (eight) hours as needed. 10 capsule 0    nepafenac (ILEVRO) 0.3 % DrpS Apply to eye every evening.        Allergies: Patient has no known allergies.    Family History   Problem Relation Age of Onset    Heart attack Father      Social History     Tobacco Use    Smoking status: Never Smoker    Smokeless tobacco: Never Used   Substance Use Topics    Alcohol use: Yes     Comment: social    Drug use: Never     Review of Systems   Unable to perform ROS: Patient unresponsive     Objective:     Vitals:    Pulse: 84  BP: 109/65  MAP (mmHg): 82  Resp: (!) 22  SpO2: 100 %  Oxygen Concentration (%): 40  O2 Device (Oxygen Therapy): ventilator  Vent Mode: A/C  Set Rate: 12 BPM  Vt Set: 500 mL  PEEP/CPAP: 5 cmH20  Peak Airway Pressure: 27 cmH2O  Mean Airway Pressure: 11 cmH20    Temp  Min: 98.4 °F (36.9 °C)  Max: 99.9 °F (37.7 °C)  Pulse  Min: 81  Max: 128  BP  Min: 83/57  Max: 127/67  MAP (mmHg)  Min: 62  Max: 86  CVP (mean)  Min: 12 mmHg  Max: 17 mmHg  Resp  Min: 13   Max: 37  SpO2  Min: 85 %  Max: 100 %  Oxygen Concentration (%)  Min: 40  Max: 40    No intake/output data recorded.           Physical Exam  Vitals signs and nursing note reviewed.   HENT:      Head: Normocephalic.   Cardiovascular:      Rate and Rhythm: Normal rate and regular rhythm.   Pulmonary:      Effort: Pulmonary effort is normal.   Abdominal:      General: Bowel sounds are normal.      Palpations: Abdomen is soft.   Skin:     Capillary Refill: Capillary refill takes 2 to 3 seconds.   Neurological:      Comments: [id number]  Exam on propofol 15mcg/kg/min    PERRLA, 3mm/3mm  Corneal reflex intact  Cough intact   Rotation/extension bilateral upper   No response bilateral lower extremities            Today I personally reviewed pertinent medications, lines/drains/airways, imaging, cardiology results, laboratory results, microbiology results, notably:       no concerns

## 2022-01-26 NOTE — H&P PST ADULT - ATTENDING COMMENTS
Patient has right breast IDC ER + DE + [id number] negative in the upper outer quadrant. She is undergoing bilateral nipple sparing mastectomy with right sentinel lymph node biopsy possible axillary dissection. Technical aspects, alternative, and risks v benefits were discussed. All questions were answered.

## 2022-01-26 NOTE — H&P PST ADULT - VENOUS THROMBOEMBOLISM FOR WOMEN ONLY
How Severe Is Your Lipoma?: moderate
Is This A New Presentation, Or A Follow-Up?: Skin Lesion
(0) indicator not present

## 2022-01-26 NOTE — H&P PST ADULT - NSICDXPASTMEDICALHX_GEN_ALL_CORE_FT
PAST MEDICAL HISTORY:  Breast cancer, right     No pertinent past medical history     Ovarian cyst

## 2022-01-27 ENCOUNTER — NON-APPOINTMENT (OUTPATIENT)
Age: 47
End: 2022-01-27

## 2022-01-28 PROBLEM — C50.911 MALIGNANT NEOPLASM OF UNSPECIFIED SITE OF RIGHT FEMALE BREAST: Chronic | Status: ACTIVE | Noted: 2022-01-26

## 2022-01-28 PROBLEM — N83.209 UNSPECIFIED OVARIAN CYST, UNSPECIFIED SIDE: Chronic | Status: ACTIVE | Noted: 2022-01-26

## 2022-02-03 ENCOUNTER — APPOINTMENT (OUTPATIENT)
Dept: MRI IMAGING | Facility: CLINIC | Age: 47
End: 2022-02-03
Payer: COMMERCIAL

## 2022-02-03 PROCEDURE — 74183 MRI ABD W/O CNTR FLWD CNTR: CPT | Mod: 26

## 2022-02-07 ENCOUNTER — APPOINTMENT (OUTPATIENT)
Dept: MRI IMAGING | Facility: CLINIC | Age: 47
End: 2022-02-07

## 2022-02-07 ENCOUNTER — RESULT REVIEW (OUTPATIENT)
Age: 47
End: 2022-02-07

## 2022-02-07 PROCEDURE — 86900 BLOOD TYPING SEROLOGIC ABO: CPT

## 2022-02-07 PROCEDURE — G0463: CPT

## 2022-02-07 PROCEDURE — 80048 BASIC METABOLIC PNL TOTAL CA: CPT

## 2022-02-07 PROCEDURE — 86850 RBC ANTIBODY SCREEN: CPT

## 2022-02-07 PROCEDURE — 86901 BLOOD TYPING SEROLOGIC RH(D): CPT

## 2022-02-07 PROCEDURE — 85027 COMPLETE CBC AUTOMATED: CPT

## 2022-02-07 PROCEDURE — 36415 COLL VENOUS BLD VENIPUNCTURE: CPT

## 2022-02-08 ENCOUNTER — TRANSCRIPTION ENCOUNTER (OUTPATIENT)
Age: 47
End: 2022-02-08

## 2022-02-09 ENCOUNTER — APPOINTMENT (OUTPATIENT)
Dept: PLASTIC SURGERY | Facility: HOSPITAL | Age: 47
End: 2022-02-09
Payer: COMMERCIAL

## 2022-02-09 ENCOUNTER — APPOINTMENT (OUTPATIENT)
Dept: SURGERY | Facility: HOSPITAL | Age: 47
End: 2022-02-09
Payer: COMMERCIAL

## 2022-02-09 ENCOUNTER — RESULT REVIEW (OUTPATIENT)
Age: 47
End: 2022-02-09

## 2022-02-09 ENCOUNTER — INPATIENT (INPATIENT)
Facility: HOSPITAL | Age: 47
LOS: 1 days | Discharge: HOME HEALTH | DRG: 581 | End: 2022-02-11
Attending: INTERNAL MEDICINE | Admitting: INTERNAL MEDICINE
Payer: COMMERCIAL

## 2022-02-09 ENCOUNTER — APPOINTMENT (OUTPATIENT)
Age: 47
End: 2022-02-09

## 2022-02-09 VITALS
RESPIRATION RATE: 14 BRPM | TEMPERATURE: 98 F | HEIGHT: 64 IN | DIASTOLIC BLOOD PRESSURE: 84 MMHG | WEIGHT: 142.42 LBS | HEART RATE: 50 BPM | SYSTOLIC BLOOD PRESSURE: 146 MMHG | OXYGEN SATURATION: 100 %

## 2022-02-09 DIAGNOSIS — Z90.721 ACQUIRED ABSENCE OF OVARIES, UNILATERAL: Chronic | ICD-10-CM

## 2022-02-09 DIAGNOSIS — C50.911 MALIGNANT NEOPLASM OF UNSPECIFIED SITE OF RIGHT FEMALE BREAST: ICD-10-CM

## 2022-02-09 PROCEDURE — 38900 IO MAP OF SENT LYMPH NODE: CPT | Mod: RT

## 2022-02-09 PROCEDURE — 19303 MAST SIMPLE COMPLETE: CPT | Mod: 50

## 2022-02-09 PROCEDURE — 38792 RA TRACER ID OF SENTINL NODE: CPT | Mod: RT,59

## 2022-02-09 PROCEDURE — 88334 PATH CONSLTJ SURG CYTO XM EA: CPT | Mod: 26

## 2022-02-09 PROCEDURE — 88333 PATH CONSLTJ SURG CYTO XM 1: CPT | Mod: 26

## 2022-02-09 PROCEDURE — 15777 ACELLULAR DERM MATRIX IMPLT: CPT | Mod: RT

## 2022-02-09 PROCEDURE — 21600 PARTIAL REMOVAL OF RIB: CPT | Mod: RT,59

## 2022-02-09 PROCEDURE — 19340 INSJ BREAST IMPLT SM D MAST: CPT | Mod: RT

## 2022-02-09 PROCEDURE — 38525 BIOPSY/REMOVAL LYMPH NODES: CPT | Mod: RT

## 2022-02-09 PROCEDURE — 88342 IMHCHEM/IMCYTCHM 1ST ANTB: CPT | Mod: 26

## 2022-02-09 PROCEDURE — 19303 MAST SIMPLE COMPLETE: CPT | Mod: AS,50

## 2022-02-09 PROCEDURE — 38530 BIOPSY/REMOVAL LYMPH NODES: CPT | Mod: RT

## 2022-02-09 PROCEDURE — 15777 ACELLULAR DERM MATRIX IMPLT: CPT | Mod: LT

## 2022-02-09 PROCEDURE — 88307 TISSUE EXAM BY PATHOLOGIST: CPT | Mod: 26

## 2022-02-09 PROCEDURE — S2068: CPT | Mod: 82,RT

## 2022-02-09 PROCEDURE — 38530 BIOPSY/REMOVAL LYMPH NODES: CPT | Mod: LT

## 2022-02-09 PROCEDURE — 88341 IMHCHEM/IMCYTCHM EA ADD ANTB: CPT | Mod: 26

## 2022-02-09 PROCEDURE — 88305 TISSUE EXAM BY PATHOLOGIST: CPT | Mod: 26

## 2022-02-09 PROCEDURE — 15877 SUCTION LIPECTOMY TRUNK: CPT

## 2022-02-09 PROCEDURE — S2068: CPT | Mod: RT

## 2022-02-09 PROCEDURE — 19340 INSJ BREAST IMPLT SM D MAST: CPT | Mod: LT

## 2022-02-09 PROCEDURE — 38790 INJECT FOR LYMPHATIC X-RAY: CPT | Mod: LT,59

## 2022-02-09 PROCEDURE — 21600 PARTIAL REMOVAL OF RIB: CPT | Mod: LT,59

## 2022-02-09 DEVICE — CLIP APPLIER ETHICON LIGACLIP 11.5" MEDIUM: Type: IMPLANTABLE DEVICE | Site: BILATERAL | Status: FUNCTIONAL

## 2022-02-09 DEVICE — GRAFT TISS ALLODERM SELECT PERF 16X20CM: Type: IMPLANTABLE DEVICE | Site: BILATERAL | Status: FUNCTIONAL

## 2022-02-09 DEVICE — CLIP 24 SMALL TITAN: Type: IMPLANTABLE DEVICE | Site: BILATERAL | Status: FUNCTIONAL

## 2022-02-09 DEVICE — IMPLANTABLE DEVICE: Type: IMPLANTABLE DEVICE | Site: BILATERAL | Status: FUNCTIONAL

## 2022-02-09 DEVICE — COUPLER VESSEL ANASTOMOTIC 2.5MM: Type: IMPLANTABLE DEVICE | Site: BILATERAL | Status: FUNCTIONAL

## 2022-02-09 DEVICE — MESH PHASIX 2.4X6.3IN: Type: IMPLANTABLE DEVICE | Site: BILATERAL | Status: FUNCTIONAL

## 2022-02-09 DEVICE — CLIP APPLIER ETHICON LIGACLIP 9 3/8" SMALL: Type: IMPLANTABLE DEVICE | Site: BILATERAL | Status: FUNCTIONAL

## 2022-02-09 DEVICE — COUPLER VESSEL MICROVASC ANAST 2MM GRN: Type: IMPLANTABLE DEVICE | Site: BILATERAL | Status: FUNCTIONAL

## 2022-02-09 DEVICE — CARTRIDGE MICROCLIP 30: Type: IMPLANTABLE DEVICE | Site: BILATERAL | Status: FUNCTIONAL

## 2022-02-09 DEVICE — HEMOCLIP MED BLUE 24 CARTRIDGE TITANIUM: Type: IMPLANTABLE DEVICE | Site: BILATERAL | Status: FUNCTIONAL

## 2022-02-09 RX ORDER — ACETAMINOPHEN 500 MG
650 TABLET ORAL EVERY 6 HOURS
Refills: 0 | Status: DISCONTINUED | OUTPATIENT
Start: 2022-02-09 | End: 2022-02-11

## 2022-02-09 RX ORDER — OXYCODONE HYDROCHLORIDE 5 MG/1
5 TABLET ORAL EVERY 4 HOURS
Refills: 0 | Status: DISCONTINUED | OUTPATIENT
Start: 2022-02-09 | End: 2022-02-09

## 2022-02-09 RX ORDER — HYDROMORPHONE HYDROCHLORIDE 2 MG/ML
1 INJECTION INTRAMUSCULAR; INTRAVENOUS; SUBCUTANEOUS
Refills: 0 | Status: DISCONTINUED | OUTPATIENT
Start: 2022-02-09 | End: 2022-02-09

## 2022-02-09 RX ORDER — ACETAMINOPHEN 500 MG
1000 TABLET ORAL EVERY 8 HOURS
Refills: 0 | Status: DISCONTINUED | OUTPATIENT
Start: 2022-02-09 | End: 2022-02-09

## 2022-02-09 RX ORDER — OXYCODONE HYDROCHLORIDE 5 MG/1
5 TABLET ORAL EVERY 4 HOURS
Refills: 0 | Status: DISCONTINUED | OUTPATIENT
Start: 2022-02-09 | End: 2022-02-11

## 2022-02-09 RX ORDER — ONDANSETRON 8 MG/1
4 TABLET, FILM COATED ORAL EVERY 6 HOURS
Refills: 0 | Status: DISCONTINUED | OUTPATIENT
Start: 2022-02-09 | End: 2022-02-11

## 2022-02-09 RX ORDER — CEFAZOLIN SODIUM 1 G
2000 VIAL (EA) INJECTION EVERY 8 HOURS
Refills: 0 | Status: COMPLETED | OUTPATIENT
Start: 2022-02-09 | End: 2022-02-10

## 2022-02-09 RX ORDER — ENOXAPARIN SODIUM 100 MG/ML
40 INJECTION SUBCUTANEOUS DAILY
Refills: 0 | Status: CANCELLED | OUTPATIENT
Start: 2022-02-10 | End: 2022-02-09

## 2022-02-09 RX ORDER — SODIUM CHLORIDE 9 MG/ML
1000 INJECTION, SOLUTION INTRAVENOUS
Refills: 0 | Status: DISCONTINUED | OUTPATIENT
Start: 2022-02-09 | End: 2022-02-09

## 2022-02-09 RX ORDER — OXYCODONE HYDROCHLORIDE 5 MG/1
10 TABLET ORAL EVERY 4 HOURS
Refills: 0 | Status: DISCONTINUED | OUTPATIENT
Start: 2022-02-09 | End: 2022-02-09

## 2022-02-09 RX ORDER — KETOROLAC TROMETHAMINE 30 MG/ML
15 SYRINGE (ML) INJECTION EVERY 6 HOURS
Refills: 0 | Status: DISCONTINUED | OUTPATIENT
Start: 2022-02-09 | End: 2022-02-09

## 2022-02-09 RX ORDER — SODIUM CHLORIDE 9 MG/ML
1000 INJECTION, SOLUTION INTRAVENOUS
Refills: 0 | Status: DISCONTINUED | OUTPATIENT
Start: 2022-02-09 | End: 2022-02-11

## 2022-02-09 RX ORDER — OXYCODONE HYDROCHLORIDE 5 MG/1
10 TABLET ORAL EVERY 4 HOURS
Refills: 0 | Status: DISCONTINUED | OUTPATIENT
Start: 2022-02-09 | End: 2022-02-11

## 2022-02-09 RX ORDER — KETOROLAC TROMETHAMINE 30 MG/ML
15 SYRINGE (ML) INJECTION EVERY 6 HOURS
Refills: 0 | Status: DISCONTINUED | OUTPATIENT
Start: 2022-02-09 | End: 2022-02-11

## 2022-02-09 RX ORDER — ONDANSETRON 8 MG/1
4 TABLET, FILM COATED ORAL ONCE
Refills: 0 | Status: DISCONTINUED | OUTPATIENT
Start: 2022-02-09 | End: 2022-02-09

## 2022-02-09 RX ORDER — HYDROMORPHONE HYDROCHLORIDE 2 MG/ML
0.5 INJECTION INTRAMUSCULAR; INTRAVENOUS; SUBCUTANEOUS
Refills: 0 | Status: DISCONTINUED | OUTPATIENT
Start: 2022-02-09 | End: 2022-02-09

## 2022-02-09 RX ORDER — CEFAZOLIN SODIUM 1 G
2000 VIAL (EA) INJECTION EVERY 8 HOURS
Refills: 0 | Status: DISCONTINUED | OUTPATIENT
Start: 2022-02-09 | End: 2022-02-09

## 2022-02-09 RX ORDER — ENOXAPARIN SODIUM 100 MG/ML
40 INJECTION SUBCUTANEOUS DAILY
Refills: 0 | Status: DISCONTINUED | OUTPATIENT
Start: 2022-02-10 | End: 2022-02-11

## 2022-02-09 RX ORDER — ACETAMINOPHEN 500 MG
2 TABLET ORAL
Qty: 0 | Refills: 0 | DISCHARGE

## 2022-02-09 RX ORDER — ONDANSETRON 8 MG/1
4 TABLET, FILM COATED ORAL EVERY 6 HOURS
Refills: 0 | Status: DISCONTINUED | OUTPATIENT
Start: 2022-02-09 | End: 2022-02-09

## 2022-02-09 RX ORDER — ACETAMINOPHEN 500 MG
650 TABLET ORAL EVERY 6 HOURS
Refills: 0 | Status: DISCONTINUED | OUTPATIENT
Start: 2022-02-09 | End: 2022-02-09

## 2022-02-09 RX ORDER — ACETAMINOPHEN 500 MG
1000 TABLET ORAL EVERY 8 HOURS
Refills: 0 | Status: COMPLETED | OUTPATIENT
Start: 2022-02-09 | End: 2022-02-10

## 2022-02-09 RX ADMIN — Medication 15 MILLIGRAM(S): at 23:48

## 2022-02-09 RX ADMIN — Medication 400 MILLIGRAM(S): at 18:56

## 2022-02-09 RX ADMIN — SODIUM CHLORIDE 50 MILLILITER(S): 9 INJECTION, SOLUTION INTRAVENOUS at 06:11

## 2022-02-09 RX ADMIN — Medication 1000 MILLIGRAM(S): at 19:30

## 2022-02-09 RX ADMIN — OXYCODONE HYDROCHLORIDE 5 MILLIGRAM(S): 5 TABLET ORAL at 23:47

## 2022-02-09 RX ADMIN — Medication 100 MILLIGRAM(S): at 21:19

## 2022-02-09 RX ADMIN — HYDROMORPHONE HYDROCHLORIDE 0.5 MILLIGRAM(S): 2 INJECTION INTRAMUSCULAR; INTRAVENOUS; SUBCUTANEOUS at 18:08

## 2022-02-09 RX ADMIN — HYDROMORPHONE HYDROCHLORIDE 0.5 MILLIGRAM(S): 2 INJECTION INTRAMUSCULAR; INTRAVENOUS; SUBCUTANEOUS at 17:58

## 2022-02-09 NOTE — BRIEF OPERATIVE NOTE - NSICDXBRIEFPROCEDURE_GEN_ALL_CORE_FT
PROCEDURES:  Bilateral modified radical mastectomies 09-Feb-2022 11:38:12 with right sentinel node biopsy Marjan Zacarias  
PROCEDURES:  Breast reconstruction with RICH free flap 09-Feb-2022 15:13:38  Licha Varela

## 2022-02-09 NOTE — H&P ADULT - HISTORY OF PRESENT ILLNESS
45 y/o female w/pmhx of breast ca and ovarian cyst. Per patient she had routine mammo, derik and MRI done 11/2021 and mass was noted to right breast. Biopsy done and per patient resulted malignant neoplasm. Patient stated that she was positive for covid in 11/2020 with no respiratory symptoms. Patient stated that she is fully covid vaccinated with booster. Patient now POD#0 Immediate bilateral hybrid breast reconstruction with deep inferior epigastric artery  flaps and prepectoral silicone implants with acellular dermal matrix following bilateral nipple-sparing mastectomies. Patient denies any complaints     VIOPTIX:  R 99% signal quality 90  L 74% signal quality 87

## 2022-02-09 NOTE — BRIEF OPERATIVE NOTE - NSICDXBRIEFPREOP_GEN_ALL_CORE_FT
PRE-OP DIAGNOSIS:  Breast cancer, right 09-Feb-2022 11:39:24  Marjan Zacarias  
PRE-OP DIAGNOSIS:  Breast cancer, right 09-Feb-2022 11:39:24  Marjan Zacarias

## 2022-02-09 NOTE — PATIENT PROFILE ADULT - FALL HARM RISK - UNIVERSAL INTERVENTIONS
Bed in lowest position, wheels locked, appropriate side rails in place/Call bell, personal items and telephone in reach/Instruct patient to call for assistance before getting out of bed or chair/Non-slip footwear when patient is out of bed/[place] to call system/Physically safe environment - no spills, clutter or unnecessary equipment/Purposeful Proactive Rounding/Room/bathroom lighting operational, light cord in reach

## 2022-02-09 NOTE — BRIEF OPERATIVE NOTE - SPECIMENS
left and right internal mammary lymph nodes
breast tissue bilateral , right axillary sentinel node biopsy

## 2022-02-09 NOTE — H&P ADULT - ASSESSMENT
Assessment:  45 y/o female w/pmhx of breast ca and ovarian cyst. Per patient she had routine mammo, derik and MRI done 11/2021 and mass was noted to right breast. Biopsy done and per patient resulted malignant neoplasm. Patient stated that she was positive for covid in 11/2020 with no respiratory symptoms. Patient stated that she is fully covid vaccinated with booster. Patient now POD#0 Immediate bilateral hybrid breast reconstruction with deep inferior epigastric artery  flaps and prepectoral silicone implants with acellular dermal matrix following bilateral nipple-sparing mastectomies. Patient denies any complaints     Plan:  - Admit to ICU  - Q1H Vioptix tissue oxygenation monitoring. Baseline vioptix R99% L74%. If VIOPTIX noted to drop by 20% or more in a 30 minute time frame will alert primary surgical team  - Serial flap assessment for signs of perfusion  - Skin assessment for color, firmness, signs of hematoma or other changes  - Abdominal incisions site  monitoring  - Hourly checks on DEMETRIA drain output  - Pain control  - Morning LABS  - Have discussed case with eICU team, including attending physician  - Any and all changes or concerns regarding RICH procedure, flap, etc will be immediately addressed with primary surgical team

## 2022-02-09 NOTE — BRIEF OPERATIVE NOTE - OPERATION/FINDINGS
Immediate bilateral hybrid breast reconstruction with deep inferior epigastric artery  flaps and prepectoral silicone implants ([employer] [place] REF SSLP-125 bilaterally) with acellular dermal matrix following bilateral nipple-sparing mastectomies.
as above

## 2022-02-09 NOTE — BRIEF OPERATIVE NOTE - NSICDXBRIEFPOSTOP_GEN_ALL_CORE_FT
POST-OP DIAGNOSIS:  Breast cancer 09-Feb-2022 11:39:50  Marjan Zacarias  
POST-OP DIAGNOSIS:  Breast cancer 09-Feb-2022 11:39:50  Marjan Zacarias

## 2022-02-09 NOTE — H&P ADULT - NSHPPHYSICALEXAM_GEN_ALL_CORE
Physical Examination:    General: No acute distress.  Alert, oriented, interactive, nonfocal    BREAST: breasts appears symmetrical, no signs of hematoma, soft to palpation, non tender, well perfused, turgor maintained, cap refill adequate. Viotpix in place over b/l nipples functioning well. DEMETRIA drains in place with serosanguinous drainage.     HEENT: Pupils equal, reactive to light.  Symmetric.    PULM: Clear to auscultation bilaterally, no significant sputum production    CVS: Regular rate and rhythm, no murmurs, rubs, or gallops    ABD: Soft, nondistended, nontender, normoactive bowel sounds, no masses. Flap removal site well approximated, staples in palce. No ative drainage, bleeding, or signs of infection.    EXT: No edema, nontender    SKIN: Warm and well perfused, no rashes noted.

## 2022-02-10 LAB
ANION GAP SERPL CALC-SCNC: 7 MMOL/L — SIGNIFICANT CHANGE UP (ref 5–17)
BUN SERPL-MCNC: 16 MG/DL — SIGNIFICANT CHANGE UP (ref 7–23)
CALCIUM SERPL-MCNC: 7.8 MG/DL — LOW (ref 8.4–10.5)
CHLORIDE SERPL-SCNC: 105 MMOL/L — SIGNIFICANT CHANGE UP (ref 96–108)
CO2 SERPL-SCNC: 26 MMOL/L — SIGNIFICANT CHANGE UP (ref 22–31)
CREAT SERPL-MCNC: 0.85 MG/DL — SIGNIFICANT CHANGE UP (ref 0.5–1.3)
GLUCOSE SERPL-MCNC: 100 MG/DL — HIGH (ref 70–99)
HCT VFR BLD CALC: 33.3 % — LOW (ref 34.5–45)
HGB BLD-MCNC: 10.9 G/DL — LOW (ref 11.5–15.5)
MAGNESIUM SERPL-MCNC: 2 MG/DL — SIGNIFICANT CHANGE UP (ref 1.6–2.6)
MCHC RBC-ENTMCNC: 30.4 PG — SIGNIFICANT CHANGE UP (ref 27–34)
MCHC RBC-ENTMCNC: 32.7 GM/DL — SIGNIFICANT CHANGE UP (ref 32–36)
MCV RBC AUTO: 92.8 FL — SIGNIFICANT CHANGE UP (ref 80–100)
NRBC # BLD: 0 /100 WBCS — SIGNIFICANT CHANGE UP (ref 0–0)
PHOSPHATE SERPL-MCNC: 4 MG/DL — SIGNIFICANT CHANGE UP (ref 2.5–4.5)
PLATELET # BLD AUTO: 159 K/UL — SIGNIFICANT CHANGE UP (ref 150–400)
POTASSIUM SERPL-MCNC: 4 MMOL/L — SIGNIFICANT CHANGE UP (ref 3.5–5.3)
POTASSIUM SERPL-SCNC: 4 MMOL/L — SIGNIFICANT CHANGE UP (ref 3.5–5.3)
RBC # BLD: 3.59 M/UL — LOW (ref 3.8–5.2)
RBC # FLD: 13.3 % — SIGNIFICANT CHANGE UP (ref 10.3–14.5)
SODIUM SERPL-SCNC: 138 MMOL/L — SIGNIFICANT CHANGE UP (ref 135–145)
WBC # BLD: 8.73 K/UL — SIGNIFICANT CHANGE UP (ref 3.8–10.5)
WBC # FLD AUTO: 8.73 K/UL — SIGNIFICANT CHANGE UP (ref 3.8–10.5)

## 2022-02-10 RX ORDER — LANOLIN ALCOHOL/MO/W.PET/CERES
3 CREAM (GRAM) TOPICAL AT BEDTIME
Refills: 0 | Status: DISCONTINUED | OUTPATIENT
Start: 2022-02-10 | End: 2022-02-11

## 2022-02-10 RX ORDER — SODIUM CHLORIDE 9 MG/ML
1000 INJECTION, SOLUTION INTRAVENOUS ONCE
Refills: 0 | Status: COMPLETED | OUTPATIENT
Start: 2022-02-10 | End: 2022-02-10

## 2022-02-10 RX ADMIN — Medication 15 MILLIGRAM(S): at 13:00

## 2022-02-10 RX ADMIN — Medication 1000 MILLIGRAM(S): at 21:55

## 2022-02-10 RX ADMIN — Medication 100 MILLIGRAM(S): at 06:06

## 2022-02-10 RX ADMIN — Medication 15 MILLIGRAM(S): at 18:35

## 2022-02-10 RX ADMIN — OXYCODONE HYDROCHLORIDE 5 MILLIGRAM(S): 5 TABLET ORAL at 09:50

## 2022-02-10 RX ADMIN — Medication 3 MILLIGRAM(S): at 20:56

## 2022-02-10 RX ADMIN — OXYCODONE HYDROCHLORIDE 5 MILLIGRAM(S): 5 TABLET ORAL at 05:02

## 2022-02-10 RX ADMIN — Medication 15 MILLIGRAM(S): at 18:18

## 2022-02-10 RX ADMIN — SODIUM CHLORIDE 75 MILLILITER(S): 9 INJECTION, SOLUTION INTRAVENOUS at 18:18

## 2022-02-10 RX ADMIN — OXYCODONE HYDROCHLORIDE 5 MILLIGRAM(S): 5 TABLET ORAL at 00:33

## 2022-02-10 RX ADMIN — ENOXAPARIN SODIUM 40 MILLIGRAM(S): 100 INJECTION SUBCUTANEOUS at 12:41

## 2022-02-10 RX ADMIN — Medication 1000 MILLIGRAM(S): at 07:00

## 2022-02-10 RX ADMIN — Medication 400 MILLIGRAM(S): at 06:00

## 2022-02-10 RX ADMIN — OXYCODONE HYDROCHLORIDE 5 MILLIGRAM(S): 5 TABLET ORAL at 10:45

## 2022-02-10 RX ADMIN — SODIUM CHLORIDE 1000 MILLILITER(S): 9 INJECTION, SOLUTION INTRAVENOUS at 10:16

## 2022-02-10 RX ADMIN — Medication 15 MILLIGRAM(S): at 23:58

## 2022-02-10 RX ADMIN — OXYCODONE HYDROCHLORIDE 10 MILLIGRAM(S): 5 TABLET ORAL at 14:58

## 2022-02-10 RX ADMIN — Medication 15 MILLIGRAM(S): at 06:08

## 2022-02-10 RX ADMIN — OXYCODONE HYDROCHLORIDE 5 MILLIGRAM(S): 5 TABLET ORAL at 21:55

## 2022-02-10 RX ADMIN — Medication 400 MILLIGRAM(S): at 21:17

## 2022-02-10 RX ADMIN — Medication 15 MILLIGRAM(S): at 00:32

## 2022-02-10 RX ADMIN — Medication 15 MILLIGRAM(S): at 12:41

## 2022-02-10 RX ADMIN — OXYCODONE HYDROCHLORIDE 5 MILLIGRAM(S): 5 TABLET ORAL at 20:56

## 2022-02-11 ENCOUNTER — TRANSCRIPTION ENCOUNTER (OUTPATIENT)
Age: 47
End: 2022-02-11

## 2022-02-11 VITALS
OXYGEN SATURATION: 93 % | DIASTOLIC BLOOD PRESSURE: 70 MMHG | RESPIRATION RATE: 12 BRPM | TEMPERATURE: 99 F | HEART RATE: 52 BPM | SYSTOLIC BLOOD PRESSURE: 124 MMHG

## 2022-02-11 PROCEDURE — C1781: CPT

## 2022-02-11 PROCEDURE — 36415 COLL VENOUS BLD VENIPUNCTURE: CPT

## 2022-02-11 PROCEDURE — 88333 PATH CONSLTJ SURG CYTO XM 1: CPT

## 2022-02-11 PROCEDURE — 88305 TISSUE EXAM BY PATHOLOGIST: CPT

## 2022-02-11 PROCEDURE — C9399: CPT

## 2022-02-11 PROCEDURE — A9541: CPT

## 2022-02-11 PROCEDURE — C1889: CPT

## 2022-02-11 PROCEDURE — 88307 TISSUE EXAM BY PATHOLOGIST: CPT

## 2022-02-11 PROCEDURE — 88341 IMHCHEM/IMCYTCHM EA ADD ANTB: CPT

## 2022-02-11 PROCEDURE — 83735 ASSAY OF MAGNESIUM: CPT

## 2022-02-11 PROCEDURE — 85027 COMPLETE CBC AUTOMATED: CPT

## 2022-02-11 PROCEDURE — 88342 IMHCHEM/IMCYTCHM 1ST ANTB: CPT

## 2022-02-11 PROCEDURE — 99261: CPT

## 2022-02-11 PROCEDURE — 80048 BASIC METABOLIC PNL TOTAL CA: CPT

## 2022-02-11 PROCEDURE — 84100 ASSAY OF PHOSPHORUS: CPT

## 2022-02-11 PROCEDURE — C1789: CPT

## 2022-02-11 PROCEDURE — 88334 PATH CONSLTJ SURG CYTO XM EA: CPT

## 2022-02-11 RX ADMIN — Medication 15 MILLIGRAM(S): at 05:35

## 2022-02-11 RX ADMIN — Medication 15 MILLIGRAM(S): at 00:15

## 2022-02-11 RX ADMIN — OXYCODONE HYDROCHLORIDE 5 MILLIGRAM(S): 5 TABLET ORAL at 05:02

## 2022-02-11 RX ADMIN — Medication 15 MILLIGRAM(S): at 05:02

## 2022-02-11 RX ADMIN — SODIUM CHLORIDE 75 MILLILITER(S): 9 INJECTION, SOLUTION INTRAVENOUS at 01:04

## 2022-02-11 RX ADMIN — OXYCODONE HYDROCHLORIDE 5 MILLIGRAM(S): 5 TABLET ORAL at 06:07

## 2022-02-11 RX ADMIN — OXYCODONE HYDROCHLORIDE 5 MILLIGRAM(S): 5 TABLET ORAL at 11:15

## 2022-02-11 RX ADMIN — OXYCODONE HYDROCHLORIDE 5 MILLIGRAM(S): 5 TABLET ORAL at 10:28

## 2022-02-11 NOTE — DISCHARGE NOTE NURSING/CASE MANAGEMENT/SOCIAL WORK - PATIENT PORTAL LINK FT
You can access the FollowMyHealth Patient Portal offered by [facility] by registering at the following website: [email or web address]. By joining [facility]’s FollowMyHealth portal, you will also be able to view your health information using other applications (apps) compatible with our system.

## 2022-02-11 NOTE — DISCHARGE NOTE PROVIDER - NSDCCPTREATMENT_GEN_ALL_CORE_FT
PRINCIPAL PROCEDURE  Procedure: Mastectomy, bilateral, with bilateral reconstruction using RICH flap  Findings and Treatment: hybrid

## 2022-02-11 NOTE — PROGRESS NOTE ADULT - SUBJECTIVE AND OBJECTIVE BOX
Offers no complaints today, surgical pain well controlled.  Ambulating with minimal assistance, tolerating PO.  Voiding.    Vital Signs Last 24 Hrs  T(C): 36.9 (10 Feb 2022 11:19), Max: 37.3 (09 Feb 2022 19:00)  T(F): 98.5 (10 Feb 2022 11:19), Max: 99.1 (09 Feb 2022 19:00)  HR: 51 (10 Feb 2022 11:19) (46 - 86)  BP: 97/50 (10 Feb 2022 11:19) (83/44 - 129/72)  BP(mean): 56 (10 Feb 2022 02:00) (56 - 74)  RR: 17 (10 Feb 2022 11:19) (11 - 17)  SpO2: 100% RA (10 Feb 2022 11:19) (100% - 100%)    Labs                        10.9   8.73  )-----------( 159      ( 10 Feb 2022 05:00 )             33.3     138  |  105  |  16  ----------------------------<  100<H>  4.0   |  26  |  0.85    Ca    7.8<L>      10 Feb 2022 05:00  Phos  4.0     02-10  Mg     2.0     02-10    Current Medications  acetaminophen     Tablet .. 650 milliGRAM(s) Oral every 6 hours  acetaminophen   IVPB .. 1000 milliGRAM(s) IV Intermittent every 8 hours  enoxaparin Injectable 40 milliGRAM(s) SubCutaneous daily  ketorolac   Injectable 15 milliGRAM(s) IV Push every 6 hours  lactated ringers. 1000 milliLiter(s) (75 mL/Hr) IV Continuous <Continuous>  ondansetron Injectable 4 milliGRAM(s) IV Push every 6 hours PRN Nausea and/or Vomiting  oxyCODONE    IR 5 milliGRAM(s) Oral every 4 hours PRN Moderate Pain (4 - 6)  oxyCODONE    IR 10 milliGRAM(s) Oral every 4 hours PRN Severe Pain (7 - 10)      Physical Exam  Gen:  WN/WD Female resting in bed, NAD  ENT:  NC/AT, no JVD noted  Lung:  CTA b/l  CV:  S1, S2. RRR  Extrem:  No C/C/E, DP/radial pulses +2  Neuro:  No gross motor/sensory deficits  Psych:  Alert and oriented x3, calm and cooperative
HPI:    47y Female PMH of Breast CA, now POD#__1___ from b/l Mastectomy, with RICH/free flap placement, as well as VIOPTIX tissue/flap oxygenation monitoring.     Current Vioptix:    Right Breast: 79%  Left Breast: 72%    Current Signal Strength    Right Breast: 91  Left Breast: 88            PAST MEDICAL & SURGICAL HISTORY:  No pertinent past medical history    Breast cancer, right    Ovarian cyst    S/P oophorectomy  Right and Right Fallopian tube removal        MEDICATIONS  (STANDING):  acetaminophen     Tablet .. 650 milliGRAM(s) Oral every 6 hours  acetaminophen   IVPB .. 1000 milliGRAM(s) IV Intermittent every 8 hours  enoxaparin Injectable 40 milliGRAM(s) SubCutaneous daily  ketorolac   Injectable 15 milliGRAM(s) IV Push every 6 hours  lactated ringers. 1000 milliLiter(s) (75 mL/Hr) IV Continuous <Continuous>    MEDICATIONS  (PRN):  ondansetron Injectable 4 milliGRAM(s) IV Push every 6 hours PRN Nausea and/or Vomiting  oxyCODONE    IR 5 milliGRAM(s) Oral every 4 hours PRN Moderate Pain (4 - 6)  oxyCODONE    IR 10 milliGRAM(s) Oral every 4 hours PRN Severe Pain (7 - 10)      Review of Systems:  CONSTITUTIONAL: No fever, chills, or fatigue  EYES: No eye pain, visual disturbances, or discharge  ENMT:  No difficulty hearing, tinnitus, vertigo; No sinus or throat pain  NECK: No pain or stiffness  RESPIRATORY: No cough, wheezing, chills or hemoptysis; No shortness of breath  CARDIOVASCULAR: No chest pain, palpitations, dizziness, or leg swelling  GASTROINTESTINAL: No abdominal or epigastric pain. No nausea, vomiting, or hematemesis; No diarrhea or constipation. No melena or hematochezia.  GENITOURINARY: No dysuria, frequency, hematuria, or incontinence  NEUROLOGICAL: No headaches, memory loss, loss of strength, numbness, or tremors  SKIN: No itching, burning, rashes, or lesions   MUSCULOSKELETAL: No joint pain or swelling; No muscle, back, or extremity pain  PSYCHIATRIC: No depression, anxiety, mood swings, or difficulty sleeping      ICU Vital Signs Last 24 Hrs  T(C): 36.9 (10 Feb 2022 15:00), Max: 37.1 (10 Feb 2022 09:41)  T(F): 98.5 (10 Feb 2022 15:00), Max: 98.8 (10 Feb 2022 09:41)  HR: 52 (10 Feb 2022 15:00) (46 - 59)  BP: 114/56 (10 Feb 2022 15:00) (83/44 - 114/56)  BP(mean): 56 (10 Feb 2022 02:00) (56 - 67)  RR: 16 (10 Feb 2022 15:00) (11 - 17)  SpO2: 100% (10 Feb 2022 15:00) (100% - 100%)                              10.9   8.73  )-----------( 159      ( 10 Feb 2022 05:00 )             33.3       02-10    138  |  105  |  16  ----------------------------<  100<H>  4.0   |  26  |  0.85    Ca    7.8<L>      10 Feb 2022 05:00  Phos  4.0     02-10  Mg     2.0     02-10            Physical Examination:    General: No acute distress.  Alert, oriented, interactive, nonfocal    BREAST: breasts appears symmetrical, no signs of hematoma, soft to palpation, non tender, well perfused, turgor maintained,cap refill adequate. Viotpix in place functioning well. DEMETRIA drains in place with serosanguinous drainage.     HEENT: Pupils equal, reactive to light.  Symmetric.    PULM: Clear to auscultation bilaterally, no significant sputum production    CVS: Regular rate and rhythm, no murmurs, rubs, or gallops    ABD: Soft, nondistended, nontender, normoactive bowel sounds, no masses. Flap removal site well approximated, staples in palce. No ative drainage, bleeding, or signs of infection.    EXT: No edema, nontender    SKIN: Warm and well perfused, no rashes noted.      
S/P Bilateral mastectomies /RICH Flap reconstruction     POD #1     Event Note:  Called to see patient this am .    Right Vioptix dropped considerably .   The patient was hypotensive but asymptomatic.   Patient was given a fluid bolus of 1 liter of LR.    Right Breast warm, good color , no skin ischemia noted, flap dopplered with good audible signal.   vioptix repositioned. Right 85%/86SQ. Left 71%/88SQ  Vioptix stable bilateral  this evening .  Vitals signs improved.  Abdomen clean , dry, incisions intact .  All drains patent with appropriate drainage.   Patient ambulated, tolerating a diet, voiding.     Dr Duncan informed .   
doing well  denies cp, sob, fevers, calf pain  on exam, both breasts are soft.   there is no mastectomy skin flap ischemia.  RICH flap have good color and cap refill. stable vioptix.  abdomen soft. incisions intact. umbilicus viable.  drain output serosang  no evidence of hematoma
doing well  no cp, no sob, no fevers, no calf pain  on exam, both breasts soft. no mastectomy and/or NAC ischemia. RICH flap with good color and cap refill. stable viotpix.  all drain serosang. no evidence of hematoma.  abdomen soft. incisions intact. umbilcus viable.  
CC: Patient is a 47y old  Female who presents with a chief complaint of Right breast cancer  (11 Feb 2022 09:21)      S: No events overnight. Patient OOB to chair and ambulatory. Voiding spontaneously. Tolerating diet and utilizing incentive spirometry.    Patient seen and examined at bedside.    ALLERGIES:  No Known Allergies      MEDICATIONS:  melatonin 3 milliGRAM(s) Oral at bedtime PRN        Vital Signs Last 24 Hrs  T(F): 98.4 (11 Feb 2022 08:29), Max: 99 (11 Feb 2022 04:50)  HR: 52 (11 Feb 2022 08:29) (48 - 52)  BP: 109/61 (11 Feb 2022 08:29) (96/59 - 114/56)  RR: 12 (11 Feb 2022 08:29) (12 - 17)  SpO2: 100% (11 Feb 2022 08:29) (97% - 100%)  I&O's Summary    10 Feb 2022 07:01  -  11 Feb 2022 07:00  --------------------------------------------------------  IN: 2900 mL / OUT: 879.5 mL / NET: 2020.5 mL        PHYSICAL EXAM:  General: NAD  ENT: MMM  Neck: Supple, No JVD  Lungs: Clear to auscultation bilaterally  Cardio: RRR, S1/S2, No murmurs  Chest: Flaps warm, soft, well perfused. 1 drain about each breast offers serosanguineous drainage, no signs of wound dehiscence or infection   Abdomen: 1 drain about each side of abdomen, abd wound c/d/i.  Soft, Nontender, Nondistended; Bowel sounds present  Extremities: No cyanosis, No edema  Neuro: no new deficits  Skin: no rashes  Psych: AAO        LABS:                        10.9   8.73  )-----------( 159      ( 10 Feb 2022 05:00 )             33.3     02-10    138  |  105  |  16  ----------------------------<  100  4.0   |  26  |  0.85    Ca    7.8      10 Feb 2022 05:00  Phos  4.0     02-10  Mg     2.0     02-10      eGFR if Non African American: 82 mL/min/1.73M2 (02-10-22 @ 05:00)  eGFR if African American: 95 mL/min/1.73M2 (02-10-22 @ 05:00)                RADIOLOGY & ADDITIONAL TESTS:    Care Discussed with Consultants/Other Providers:

## 2022-02-11 NOTE — DISCHARGE NOTE PROVIDER - NSDCFUADDINST_GEN_ALL_CORE_FT
TAKE MEDICATIONS AS PRESCRIBED BY DR. CALLE    SLEEP ON BACK    NO HEAVY LIFTING, BENDING OR STRAINING    EMPTY DRAINS TWICE A DAY AND RECORD OUTPUT AND COLOR    DO NOT SHOWER

## 2022-02-11 NOTE — DISCHARGE NOTE PROVIDER - CARE PROVIDERS DIRECT ADDRESSES
[email or web address]

## 2022-02-11 NOTE — PROGRESS NOTE ADULT - ATTENDING COMMENTS
Assessment:  1. Right breast cancer     Plan:  - Cont. Vioptix monitoring  - DEMETRIA drain output  - Wound management per surgery   - Oral diet  - OOB to chair   - D/c nina
Assessment:  1. Right breast cancer     Plan:  - DEMETRIA drain output  - Wound management per surgery   - Oral diet  - OOB to chair   - D/c planning today

## 2022-02-11 NOTE — DISCHARGE NOTE NURSING/CASE MANAGEMENT/SOCIAL WORK - NSDCPEFALRISK_GEN_ALL_CORE
For information on Fall & Injury Prevention, visit: [email or web address] OR  [email or web address] OR  https://www.cdc.gov/steadi/patient.html

## 2022-02-11 NOTE — DISCHARGE NOTE PROVIDER - HOSPITAL COURSE
HPI:  45 y/o female w/pmhx of breast ca and ovarian cyst. Per patient she had routine mammo, sono and MRI done 11/2021 and mass was noted to right breast. Biopsy done and per patient resulted malignant neoplasm. Patient stated that she was positive for covid in 11/2020 with no respiratory symptoms. Patient stated that she is fully covid vaccinated with booster. Patient now POD#0 Immediate bilateral hybrid breast reconstruction with deep inferior epigastric artery  flaps and prepectoral silicone implants with acellular dermal matrix following bilateral nipple-sparing mastectomies. Patient denies any complaints at this time.    VIOPTIX:  when leaving OR   R 99% signal quality 90  L 74% signal quality 87     (09 Feb 2022 19:41)    The patient was seen and examined by me and Dr Duncan this am.  She is without complaints.  Her pain is manageable , ambulating, tolerating a diet and voiding .      Her flaps are viable , skin w/o ischemia, vioptix stable and removed .   Dopplers audible . Abdominal incisions clean and dry .  All drains patent with appropriate drainage.   Post op instructions were given to patient by Dr Duncan and reviewed again.   She will follow up with Dr Dunacn on 2/17.

## 2022-02-11 NOTE — PROGRESS NOTE ADULT - ASSESSMENT
s/p bilateral mastectomy & hybrid RICH and implant reconstruction, POD 2  plan:  d/c home  sleep on back  prescriptions given preop  d/c vioptix  no heavy lifting  empty drains bid  no shower  office to call patient for appt 2/17
s/p bilateral nipple sparing mastectomy and hybrid RICH flap reconstruction with prepectoral implant reconstruction and acellular dermal matrix, POD 1  plan:  oob to chair  regular diet  cont DVT chemoppx  d/c wood  d/c telemetry   wean oxygen  cont vioptix with flap clinical monitoring
PLAN:    -admit to ICU  -Q1H Vioptix tissue oxygenation monitoring. Baseline vioptix as noted in HPI.  If VIOPTIX noted to drop by 20%  or more in a 30 minute time frame will alert primary surgical team  -ensure vioptix signal strength >80%  -serial flap assessment for signs of perfusion  -skin assessment for color, firmness, signs of hematoma or other changes  -abdominal incisions site  monitoring  -hourly checks on DEMETRIA drain output  -pain control  -Morning LABS  -have discussed case with eICU team, including attending physician  -any and all changes or concerns regarding RICH procedure, flap, etc will be immedietly addressed with primary surgical team 
47 year old female with PMH right breast cancer POD #1 bilateral hybrid breast reconstruction with deep inferior epigastric artery  flaps and prepectoral silicone implants with acellular dermal matrix following bilateral nipple-sparing mastectomies    Plan  Continue bioptics, stable per surgery  Monitor drain outputs  Encourage PO, activity and incentive spirometry   Analgesia/antiemetics PRN  DVT/GI prophylaxis 
ASSESSMENT:  1. RICH      PLAN:  - Discharge home today per surgery   - Vioptix have been DC'd  - Continue pain control as needed  - SW/CM for home services  - Surgical follow-up as outpatient

## 2022-02-11 NOTE — DISCHARGE NOTE PROVIDER - CARE PROVIDER_API CALL
Simone Duncan; NEVILLE)  Otolaryngology; Plastic Surgery  [address]  [place] [address]  Phone: [phone]  Fax: [phone]  Follow Up Time:     Danae Packer)  Surgery  [address] [place] [address]  Phone: [phone]  Fax: [phone]  Follow Up Time:

## 2022-02-11 NOTE — DISCHARGE NOTE PROVIDER - NSDCFUSCHEDAPPT_GEN_ALL_CORE_FT
BRYON IVEY ; 02/24/2022 ; [facility] [address]   BRYON IVEY ; 03/09/2022 ; [facility]

## 2022-02-17 ENCOUNTER — APPOINTMENT (OUTPATIENT)
Dept: PLASTIC SURGERY | Facility: CLINIC | Age: 47
End: 2022-02-17
Payer: COMMERCIAL

## 2022-02-17 VITALS — HEIGHT: 64 IN | WEIGHT: 144 LBS | BODY MASS INDEX: 24.59 KG/M2 | TEMPERATURE: 97.7 F

## 2022-02-17 PROCEDURE — 99024 POSTOP FOLLOW-UP VISIT: CPT

## 2022-02-24 ENCOUNTER — APPOINTMENT (OUTPATIENT)
Dept: PLASTIC SURGERY | Facility: CLINIC | Age: 47
End: 2022-02-24
Payer: COMMERCIAL

## 2022-02-24 PROCEDURE — 99024 POSTOP FOLLOW-UP VISIT: CPT

## 2022-03-01 ENCOUNTER — APPOINTMENT (OUTPATIENT)
Dept: SURGERY | Facility: CLINIC | Age: 47
End: 2022-03-01

## 2022-03-01 VITALS
WEIGHT: 140 LBS | OXYGEN SATURATION: 98 % | SYSTOLIC BLOOD PRESSURE: 113 MMHG | HEART RATE: 57 BPM | TEMPERATURE: 98.1 F | DIASTOLIC BLOOD PRESSURE: 69 MMHG | BODY MASS INDEX: 23.9 KG/M2 | HEIGHT: 64 IN

## 2022-03-07 ENCOUNTER — APPOINTMENT (OUTPATIENT)
Dept: PLASTIC SURGERY | Facility: CLINIC | Age: 47
End: 2022-03-07
Payer: COMMERCIAL

## 2022-03-07 VITALS
HEART RATE: 68 BPM | DIASTOLIC BLOOD PRESSURE: 83 MMHG | WEIGHT: 140 LBS | TEMPERATURE: 98.4 F | BODY MASS INDEX: 23.9 KG/M2 | HEIGHT: 64 IN | OXYGEN SATURATION: 97 % | SYSTOLIC BLOOD PRESSURE: 123 MMHG

## 2022-03-07 PROCEDURE — 99024 POSTOP FOLLOW-UP VISIT: CPT

## 2022-03-08 ENCOUNTER — OUTPATIENT (OUTPATIENT)
Dept: OUTPATIENT SERVICES | Facility: HOSPITAL | Age: 47
LOS: 1 days | Discharge: HOME | End: 2022-03-08

## 2022-03-08 DIAGNOSIS — C50.919 MALIGNANT NEOPLASM OF UNSPECIFIED SITE OF UNSPECIFIED FEMALE BREAST: ICD-10-CM

## 2022-03-08 DIAGNOSIS — Z90.721 ACQUIRED ABSENCE OF OVARIES, UNILATERAL: Chronic | ICD-10-CM

## 2022-03-09 ENCOUNTER — APPOINTMENT (OUTPATIENT)
Dept: RADIATION ONCOLOGY | Facility: CLINIC | Age: 47
End: 2022-03-09
Payer: COMMERCIAL

## 2022-03-09 PROCEDURE — 99215 OFFICE O/P EST HI 40 MIN: CPT | Mod: 25,95

## 2022-03-09 NOTE — DISEASE MANAGEMENT
[Pathological] : TNM Stage: p [IA] : IA [FreeTextEntry4] : ILC right breast, grade 2, ER+/UT+, HER2- [TTNM] : 2(m) [NTNM] : 0 [MTNM] : 0

## 2022-03-09 NOTE — HISTORY OF PRESENT ILLNESS
[Home] : at home, [unfilled] , at the time of the visit. [Medical Office: ([facility] [place])___] : at the medical office located in  [Verbal consent obtained from patient] : the patient, [unfilled] [FreeTextEntry1] : 47 year old woman returns today for reevaluation and consideration of radiation therapy \par \par Recall, she underwent bilateral screening mammogram on 11/3/21, which noted questionable new asymmetry in the upper outer posterior right breast. BI-RADS 0.\par \par 12/30/21 diagnostic right mammogram and targeted breast ultrasound noted a persistent asymmetry in the right breast UOQ, with corresponding 1.7 cm irregular hypoechoic mass at 10:00 6cm FN.\par \par 12/9/21 ultrasound guided needle core biopsy showed invasive mammary duct carcinoma, grade 2, ER 90%, IA >90%, HER2 negative. DCIS was not present. No LVI identified.\par \par 12/20/21 MRI breast showed a 2.1 cm lesion in the right breast 10:00, with additional asymmetric nonmass enhancement involving most of the upper right breast.  No lymphadenopathy and no MR evidence of malignancy in the contralateral breast.\par \par She met with Dr. Rubio on 12/29/21.\par \par 2/9/22: bilateral mastectomy and hybrid reconstruction (RICH flap, prepectoral silicone implant, acellular dermal matrix), right SLNB.  Pathology showed multifocal invasive lobular carcinoma, grade 2, measuring 3 cm and 0.7 cm.  No LVI.  Resection margins negative, closest margin 0.3 mm from deep margin.  Extensive LCIS.  Two right axillary SLN and one right IMN were negative for carcinoma.  pT2 N0.\par \par Recovering well post-operatively.  Recently saw Araceli Duncan and Birdie in the last week.\par \par Dr Packer 3/1/22\par Dr Duncan 3/7/22\par Dr Rubio

## 2022-03-30 ENCOUNTER — OUTPATIENT (OUTPATIENT)
Dept: OUTPATIENT SERVICES | Facility: HOSPITAL | Age: 47
LOS: 1 days | Discharge: HOME | End: 2022-03-30

## 2022-03-30 DIAGNOSIS — C50.919 MALIGNANT NEOPLASM OF UNSPECIFIED SITE OF UNSPECIFIED FEMALE BREAST: ICD-10-CM

## 2022-03-30 DIAGNOSIS — Z90.721 ACQUIRED ABSENCE OF OVARIES, UNILATERAL: Chronic | ICD-10-CM

## 2022-03-31 ENCOUNTER — APPOINTMENT (OUTPATIENT)
Dept: HEMATOLOGY ONCOLOGY | Facility: CLINIC | Age: 47
End: 2022-03-31
Payer: COMMERCIAL

## 2022-03-31 VITALS
BODY MASS INDEX: 24.41 KG/M2 | DIASTOLIC BLOOD PRESSURE: 80 MMHG | SYSTOLIC BLOOD PRESSURE: 123 MMHG | HEIGHT: 64 IN | OXYGEN SATURATION: 99 % | HEART RATE: 67 BPM | WEIGHT: 143 LBS

## 2022-03-31 PROCEDURE — 99214 OFFICE O/P EST MOD 30 MIN: CPT

## 2022-03-31 RX ORDER — CEFADROXIL 500 MG/1
500 CAPSULE ORAL
Qty: 14 | Refills: 0 | Status: DISCONTINUED | COMMUNITY
Start: 2022-02-04 | End: 2022-03-31

## 2022-03-31 RX ORDER — OXYCODONE AND ACETAMINOPHEN 5; 325 MG/1; MG/1
5-325 TABLET ORAL
Qty: 20 | Refills: 0 | Status: DISCONTINUED | COMMUNITY
Start: 2022-02-04 | End: 2022-03-31

## 2022-03-31 RX ORDER — IBUPROFEN 800 MG/1
800 TABLET, FILM COATED ORAL
Qty: 30 | Refills: 0 | Status: DISCONTINUED | COMMUNITY
Start: 2022-02-04 | End: 2022-03-31

## 2022-03-31 NOTE — CONSULT LETTER
[Dear  ___] : Dear  [unfilled], [Consult Letter:] : I had the pleasure of evaluating your patient, [unfilled]. [Please see my note below.] : Please see my note below. [Consult Closing:] : Thank you very much for allowing me to participate in the care of this patient.  If you have any questions, please do not hesitate to contact me. [Sincerely,] : Sincerely, [DrSylvester  ___] : Dr. HATCH [FreeTextEntry3] : Daine Rubio MD\par Medical Oncology/Hematology\par [facility] [place], [facility]\par \par \par [facility] at [facility]\par

## 2022-03-31 NOTE — PHYSICAL EXAM
[Normal] : affect appropriate [de-identified] : supple [de-identified] : no palpable R breast mass or axillary adenopathy. +ecchymosis at site of biopsy R breast.

## 2022-03-31 NOTE — ASSESSMENT
[FreeTextEntry1] : 47 year old premenopausal female with stage IIA right breast invasive lobular carcinoma, classic type, multifocal (pT2N0) ER 90%, WV>90%, HER2 negative. S/p bilateral mastectomy with RICH reconstruction on 2/9/22.\par Final pathology showed multifocal ILC, 2 foci measuring 3 cm and 0.7 cm each, margins negative but closest margin 0.3 mm away (deep), 0/3 lymph nodes. \par Oncotype Dx RS 14, chemo benefit <1%, distant risk of recurrence at 9 years with Clay is 4%.\par She was seen by rad-onc. \par \par LMP 3/2022.\par \par Discussed and reviewed Onctype Dx: 15. Discussed adjuvant endocrine therapy with Tamoxifen. Side effects may include but are not limited to can cause hot flashes, vaginal dryness, mood changes, elevation in liver enzymes, increased VTE/stroke risk, risk of endometrial cancer. Discussed the need to see GYN every 6 months - 1 year while on Tamoxifen. Plan for therapy for 10 years.  \par \par Plan: \par - Start Tamoxifen \par - Follow-up in 3 months

## 2022-04-12 ENCOUNTER — APPOINTMENT (OUTPATIENT)
Dept: OBGYN | Facility: CLINIC | Age: 47
End: 2022-04-12
Payer: COMMERCIAL

## 2022-04-12 ENCOUNTER — NON-APPOINTMENT (OUTPATIENT)
Age: 47
End: 2022-04-12

## 2022-04-12 VITALS
WEIGHT: 144 LBS | DIASTOLIC BLOOD PRESSURE: 81 MMHG | HEIGHT: 64 IN | BODY MASS INDEX: 24.59 KG/M2 | HEART RATE: 79 BPM | SYSTOLIC BLOOD PRESSURE: 147 MMHG

## 2022-04-12 PROCEDURE — 99396 PREV VISIT EST AGE 40-64: CPT

## 2022-04-14 NOTE — PHYSICAL EXAM
[Appropriately responsive] : appropriately responsive [Alert] : alert [No Acute Distress] : no acute distress [No Lymphadenopathy] : no lymphadenopathy [Regular Rate Rhythm] : regular rate rhythm [No Murmurs] : no murmurs [Clear to Auscultation B/L] : clear to auscultation bilaterally [Soft] : soft [Non-tender] : non-tender [Non-distended] : non-distended [No HSM] : No HSM [No Lesions] : no lesions [No Mass] : no mass [Oriented x3] : oriented x3 [FreeTextEntry6] : sp charles mastectomies w reconstruction. [Examination Of The Breasts] : a normal appearance [No Masses] : no breast masses were palpable [Labia Majora] : normal [Labia Minora] : normal [Normal] : normal [Uterine Adnexae] : normal

## 2022-04-14 NOTE — HISTORY OF PRESENT ILLNESS
[FreeTextEntry1] : 48 yo p3 here for annual exam. dxd w br ca, had charles mastectomies, on tamoxifen . \par no gyn co.

## 2022-04-25 ENCOUNTER — APPOINTMENT (OUTPATIENT)
Dept: PLASTIC SURGERY | Facility: CLINIC | Age: 47
End: 2022-04-25
Payer: COMMERCIAL

## 2022-04-25 VITALS
OXYGEN SATURATION: 100 % | DIASTOLIC BLOOD PRESSURE: 88 MMHG | BODY MASS INDEX: 24.75 KG/M2 | WEIGHT: 145 LBS | HEART RATE: 64 BPM | TEMPERATURE: 98.1 F | HEIGHT: 64 IN | SYSTOLIC BLOOD PRESSURE: 135 MMHG

## 2022-04-25 PROCEDURE — 99024 POSTOP FOLLOW-UP VISIT: CPT

## 2022-04-25 NOTE — ASSESSMENT
[FreeTextEntry1] : All medical record entries were at my, Dr. Simone Duncan, direction and personally dictated by me. I have reviewed the chart and agree that the record accurately reflects my personal performance of the history, physical exam, assessment and plan.\par

## 2022-04-26 ENCOUNTER — APPOINTMENT (OUTPATIENT)
Dept: HEPATOLOGY | Facility: CLINIC | Age: 47
End: 2022-04-26
Payer: COMMERCIAL

## 2022-04-26 VITALS
HEART RATE: 56 BPM | BODY MASS INDEX: 24.5 KG/M2 | WEIGHT: 143.5 LBS | SYSTOLIC BLOOD PRESSURE: 110 MMHG | OXYGEN SATURATION: 98 % | TEMPERATURE: 98.6 F | DIASTOLIC BLOOD PRESSURE: 60 MMHG | HEIGHT: 64 IN

## 2022-04-26 DIAGNOSIS — Z79.899 OTHER LONG TERM (CURRENT) DRUG THERAPY: ICD-10-CM

## 2022-04-26 PROCEDURE — 99204 OFFICE O/P NEW MOD 45 MIN: CPT

## 2022-04-26 NOTE — HISTORY OF PRESENT ILLNESS
[de-identified] : Ms. Bernie Harrington 47 yoF with pmhx of right breast cancer here for evaluation of a liver lesion. Had abdo MRI on 2/3/22 which was done with gadavist showed a 1.4 cm hypervascular mass in segment 4B that was indeterminate. MRI was repeated on 2/7/22 with evoist and features compatible with focal nodular hyperplasia. She took oral contraceptives from age 17 until early 30s, been off for 15 yrs. \par \par She was diagnosed with right breast cancer at age 46 in Dec 2021. Had b/l mastectomy with RICH reconstruction on 2/9/22. She was started on Tamoxifen 1 month ago, plan for 10 yrs treatment. \par \par Never a smoker, denies recreational drug use. Drinks alcohol on the weekend about 4 drinks for about 14-15 yrs.

## 2022-04-26 NOTE — PHYSICAL EXAM
[General Appearance - Alert] : alert [General Appearance - In No Acute Distress] : in no acute distress [Sclera] : the sclera and conjunctiva were normal [Neck Appearance] : the appearance of the neck was normal [Neck Cervical Mass (___cm)] : no neck mass was observed [] : no respiratory distress [Respiration, Rhythm And Depth] : normal respiratory rhythm and effort [Auscultation Breath Sounds / Voice Sounds] : lungs were clear to auscultation bilaterally [Heart Rate And Rhythm] : heart rate was normal and rhythm regular [Heart Sounds] : normal S1 and S2 [Edema] : there was no peripheral edema [Bowel Sounds] : normal bowel sounds [Abdomen Soft] : soft [Abdomen Tenderness] : non-tender [Nail Clubbing] : no clubbing  or cyanosis of the fingernails [Skin Color & Pigmentation] : normal skin color and pigmentation [Oriented To Time, Place, And Person] : oriented to person, place, and time [Scleral Icterus] : No Scleral Icterus [Abdominal  Ascites] : no ascites [Asterixis] : no asterixis observed [Jaundice] : No jaundice [Palmar Erythema] : no Palmar Erythema

## 2022-04-26 NOTE — ASSESSMENT
[FreeTextEntry1] : Ms. Bernie Harrington 47 yoF with pmhx of right breast cancer here for evaluation of a liver lesion. \par \par #Focal nodular hyperplasia.\par -Abdo MRI on 2/3/22 which was done with gadavist showed a 1.4 cm hypervascular mass in segment 4B that was indeterminate. MRI was repeated on 2/7/22 with evoist and features compatible with focal nodular hyperplasia. \par -Was on oral contraceptives from age 17 until early 30s, been off for 15 yrs\par -Explained that FNH is benign and does not require treatment. She is not on OCP, will repeat MRI in 6 months to assess for stability.  Will get AFP level. \par \par #High risk medication\par - Recently started on Tamoxifen 1 month ago, plan for 10 yrs treatment.\par -Explained to pt that long term tamoxifen therapy has been linked to the development of fatty liver/ steatohepatitis and hepatic fibrosis. In some prospective studies, up to one third of women have developed fatty liver during 1 to 3 years of tamoxifen therapy. Several instances of cirrhosis have been described after therapy with tamoxifen for 3 to 5 years. \par -Recommended baseline fibroscan test to assess for fibrosis and steatosis. Will get BW today to check liver enzymes. \par \par Plan:\par BW, Fibroscan test, F/U in Aug with repeat MRI.

## 2022-04-27 LAB
AFP-TM SERPL-MCNC: 2.4 NG/ML
ALBUMIN SERPL ELPH-MCNC: 4.8 G/DL
ALP BLD-CCNC: 58 U/L
ALT SERPL-CCNC: 15 U/L
ANION GAP SERPL CALC-SCNC: 12 MMOL/L
AST SERPL-CCNC: 25 U/L
BASOPHILS # BLD AUTO: 0.06 K/UL
BASOPHILS NFR BLD AUTO: 0.7 %
BILIRUB SERPL-MCNC: 0.4 MG/DL
BUN SERPL-MCNC: 16 MG/DL
CALCIUM SERPL-MCNC: 9.8 MG/DL
CHLORIDE SERPL-SCNC: 103 MMOL/L
CO2 SERPL-SCNC: 25 MMOL/L
CREAT SERPL-MCNC: 0.75 MG/DL
CYTOLOGY CVX/VAG DOC THIN PREP: NORMAL
EGFR: 99 ML/MIN/1.73M2
EOSINOPHIL # BLD AUTO: 0.11 K/UL
EOSINOPHIL NFR BLD AUTO: 1.4 %
HCT VFR BLD CALC: 41.9 %
HGB BLD-MCNC: 13.5 G/DL
HPV HIGH+LOW RISK DNA PNL CVX: NOT DETECTED
IMM GRANULOCYTES NFR BLD AUTO: 0.1 %
LYMPHOCYTES # BLD AUTO: 2.43 K/UL
LYMPHOCYTES NFR BLD AUTO: 30.2 %
MAN DIFF?: NORMAL
MCHC RBC-ENTMCNC: 30.1 PG
MCHC RBC-ENTMCNC: 32.2 GM/DL
MCV RBC AUTO: 93.3 FL
MONOCYTES # BLD AUTO: 0.48 K/UL
MONOCYTES NFR BLD AUTO: 6 %
NEUTROPHILS # BLD AUTO: 4.96 K/UL
NEUTROPHILS NFR BLD AUTO: 61.6 %
PLATELET # BLD AUTO: 262 K/UL
POTASSIUM SERPL-SCNC: 4.8 MMOL/L
PROT SERPL-MCNC: 7 G/DL
RBC # BLD: 4.49 M/UL
RBC # FLD: 13.2 %
SODIUM SERPL-SCNC: 140 MMOL/L
WBC # FLD AUTO: 8.05 K/UL

## 2022-05-02 ENCOUNTER — APPOINTMENT (OUTPATIENT)
Dept: HEPATOLOGY | Facility: CLINIC | Age: 47
End: 2022-05-02
Payer: COMMERCIAL

## 2022-05-02 DIAGNOSIS — R16.0 HEPATOMEGALY, NOT ELSEWHERE CLASSIFIED: ICD-10-CM

## 2022-05-02 PROCEDURE — 91200 LIVER ELASTOGRAPHY: CPT

## 2022-05-17 ENCOUNTER — NON-APPOINTMENT (OUTPATIENT)
Age: 47
End: 2022-05-17

## 2022-06-14 ENCOUNTER — APPOINTMENT (OUTPATIENT)
Dept: PLASTIC SURGERY | Facility: AMBULATORY SURGERY CENTER | Age: 47
End: 2022-06-14

## 2022-06-14 LAB — SARS-COV-2 N GENE NPH QL NAA+PROBE: NOT DETECTED

## 2022-06-14 PROCEDURE — 19380 REVJ RECONSTRUCTED BREAST: CPT | Mod: 50

## 2022-06-14 PROCEDURE — 22901 EXC ABDL TUM DEEP 5 CM/>: CPT

## 2022-06-14 PROCEDURE — 15734 MUSCLE-SKIN GRAFT TRUNK: CPT | Mod: AS

## 2022-06-14 PROCEDURE — 15734 MUSCLE-SKIN GRAFT TRUNK: CPT

## 2022-06-14 PROCEDURE — 15877 SUCTION LIPECTOMY TRUNK: CPT | Mod: 59

## 2022-06-14 PROCEDURE — 22901 EXC ABDL TUM DEEP 5 CM/>: CPT | Mod: AS

## 2022-06-23 ENCOUNTER — OUTPATIENT (OUTPATIENT)
Dept: OUTPATIENT SERVICES | Facility: HOSPITAL | Age: 47
LOS: 1 days | Discharge: HOME | End: 2022-06-23

## 2022-06-23 DIAGNOSIS — Z90.721 ACQUIRED ABSENCE OF OVARIES, UNILATERAL: Chronic | ICD-10-CM

## 2022-06-23 DIAGNOSIS — C50.919 MALIGNANT NEOPLASM OF UNSPECIFIED SITE OF UNSPECIFIED FEMALE BREAST: ICD-10-CM

## 2022-06-28 ENCOUNTER — APPOINTMENT (OUTPATIENT)
Dept: PLASTIC SURGERY | Facility: CLINIC | Age: 47
End: 2022-06-28

## 2022-06-28 VITALS
DIASTOLIC BLOOD PRESSURE: 91 MMHG | BODY MASS INDEX: 24.41 KG/M2 | HEART RATE: 59 BPM | HEIGHT: 64 IN | WEIGHT: 143 LBS | OXYGEN SATURATION: 99 % | SYSTOLIC BLOOD PRESSURE: 137 MMHG

## 2022-06-28 PROCEDURE — 99024 POSTOP FOLLOW-UP VISIT: CPT

## 2022-06-30 ENCOUNTER — APPOINTMENT (OUTPATIENT)
Dept: HEMATOLOGY ONCOLOGY | Facility: CLINIC | Age: 47
End: 2022-06-30

## 2022-06-30 ENCOUNTER — APPOINTMENT (OUTPATIENT)
Dept: SURGERY | Facility: CLINIC | Age: 47
End: 2022-06-30

## 2022-06-30 ENCOUNTER — RESULT REVIEW (OUTPATIENT)
Age: 47
End: 2022-06-30

## 2022-06-30 VITALS
SYSTOLIC BLOOD PRESSURE: 134 MMHG | HEIGHT: 64 IN | DIASTOLIC BLOOD PRESSURE: 81 MMHG | WEIGHT: 143 LBS | TEMPERATURE: 97.9 F | OXYGEN SATURATION: 99 % | BODY MASS INDEX: 24.41 KG/M2 | HEART RATE: 62 BPM

## 2022-06-30 VITALS
DIASTOLIC BLOOD PRESSURE: 76 MMHG | HEART RATE: 50 BPM | WEIGHT: 141 LBS | HEIGHT: 64 IN | SYSTOLIC BLOOD PRESSURE: 114 MMHG | BODY MASS INDEX: 24.07 KG/M2 | OXYGEN SATURATION: 97 %

## 2022-06-30 DIAGNOSIS — Z85.3 ENCOUNTER FOR FOLLOW-UP EXAMINATION AFTER COMPLETED TREATMENT FOR MALIGNANT NEOPLASM: ICD-10-CM

## 2022-06-30 DIAGNOSIS — Z85.3 PERSONAL HISTORY OF MALIGNANT NEOPLASM OF BREAST: ICD-10-CM

## 2022-06-30 DIAGNOSIS — Z08 ENCOUNTER FOR FOLLOW-UP EXAMINATION AFTER COMPLETED TREATMENT FOR MALIGNANT NEOPLASM: ICD-10-CM

## 2022-06-30 LAB
BASOPHILS # BLD AUTO: 0.1 K/UL — SIGNIFICANT CHANGE UP (ref 0–0.2)
BASOPHILS NFR BLD AUTO: 1.2 % — SIGNIFICANT CHANGE UP (ref 0–2)
EOSINOPHIL # BLD AUTO: 0.1 K/UL — SIGNIFICANT CHANGE UP (ref 0–0.5)
EOSINOPHIL NFR BLD AUTO: 1.1 % — SIGNIFICANT CHANGE UP (ref 0–6)
HCT VFR BLD CALC: 40.2 % — SIGNIFICANT CHANGE UP (ref 34.5–45)
HGB BLD-MCNC: 13.3 G/DL — SIGNIFICANT CHANGE UP (ref 11.5–15.5)
LYMPHOCYTES # BLD AUTO: 2.4 K/UL — SIGNIFICANT CHANGE UP (ref 1–3.3)
LYMPHOCYTES # BLD AUTO: 33.6 % — SIGNIFICANT CHANGE UP (ref 13–44)
MCHC RBC-ENTMCNC: 30.8 PG — SIGNIFICANT CHANGE UP (ref 27–34)
MCHC RBC-ENTMCNC: 33 G/DL — SIGNIFICANT CHANGE UP (ref 32–36)
MCV RBC AUTO: 93.3 FL — SIGNIFICANT CHANGE UP (ref 80–100)
MONOCYTES # BLD AUTO: 0.4 K/UL — SIGNIFICANT CHANGE UP (ref 0–0.9)
MONOCYTES NFR BLD AUTO: 5.6 % — SIGNIFICANT CHANGE UP (ref 2–14)
NEUTROPHILS # BLD AUTO: 4.2 K/UL — SIGNIFICANT CHANGE UP (ref 1.8–7.4)
NEUTROPHILS NFR BLD AUTO: 58.4 % — SIGNIFICANT CHANGE UP (ref 43–77)
PLATELET # BLD AUTO: 254 K/UL — SIGNIFICANT CHANGE UP (ref 150–400)
RBC # BLD: 4.31 M/UL — SIGNIFICANT CHANGE UP (ref 3.8–5.2)
RBC # FLD: 12.2 % — SIGNIFICANT CHANGE UP (ref 10.3–14.5)
WBC # BLD: 7.2 K/UL — SIGNIFICANT CHANGE UP (ref 3.8–10.5)
WBC # FLD AUTO: 7.2 K/UL — SIGNIFICANT CHANGE UP (ref 3.8–10.5)

## 2022-06-30 PROCEDURE — 99214 OFFICE O/P EST MOD 30 MIN: CPT

## 2022-06-30 NOTE — ASSESSMENT
[FreeTextEntry1] : 48 yo premenopausal female with a hx of stage 2 right breast cancer treated with bilateral mastectomy and right SLnB now tolerating Tamoxifen. No evidence of recurrence on examination\par 1. Follow up in 6 months\par 2. Continue Tamoxifen\par 3. Follow up with Dr. Duncan\par 4. Follow up with Dr. Rubio

## 2022-06-30 NOTE — CONSULT LETTER
[Dear  ___] : Dear  [unfilled], [Consult Letter:] : I had the pleasure of evaluating your patient, [unfilled]. [Please see my note below.] : Please see my note below. [Consult Closing:] : Thank you very much for allowing me to participate in the care of this patient.  If you have any questions, please do not hesitate to contact me. [Sincerely,] : Sincerely, [DrSylvester  ___] : Dr. HATCH [FreeTextEntry3] : Diane Rubio MD\par Medical Oncology/Hematology\par [facility] [place], [facility]\par \par \par [facility] at [facility]\par

## 2022-06-30 NOTE — PHYSICAL EXAM
[Normocephalic] : normocephalic [Atraumatic] : atraumatic [EOMI] : extra ocular movement intact [PERRL] : pupils equal, round and reactive to light [Sclera nonicteric] : sclera nonicteric [Supple] : supple [No Supraclavicular Adenopathy] : no supraclavicular adenopathy [Examined in the supine and seated position] : examined in the supine and seated position [No dominant masses] : no dominant masses in right breast  [No dominant masses] : no dominant masses left breast [No Nipple Retraction] : no left nipple retraction [No Nipple Discharge] : no left nipple discharge [No Axillary Lymphadenopathy] : no left axillary lymphadenopathy [No Edema] : no edema [No Rashes] : no rashes [No Ulceration] : no ulceration [de-identified] : Reconstructed mastectomy flap with RICH in place. Everted nipple; no skin changes.  [de-identified] : Reconstructed mastectomy flap with RICH in place. Everted nipple; no skin changes.

## 2022-06-30 NOTE — HISTORY OF PRESENT ILLNESS
[FreeTextEntry1] : I had the pleasure of seeing BERNIE IVEY in the office for follow up clinical examination.  She is s/p RICH revision 2 weeks ago and is due to see Dr. Duncan July 11. \par \par Bernie is a jaskaran 46 yo premenopausal female who was diagnosed with right breast IDC grade 2 ER 90% HI >90% Her2 negative. Screening imaging demonstrated a new questionable asymmetry in the right breast. Call back mammogram recommended for biopsy of the 1.7 cm nodule. Biopsy was performed on 12/9/2021 demonstrating IDC grade 2 ER 90% HI >90% Her2 negative.\par \par She underwent bilateral mastectomy with right SLNB and RICH reconstruction. Oncotype score was 15. \par 0/2 right sentinel lymph nodes. \par Pathology: \par Left breast; nipple sparing: benign findings.\par Right breast; nipple sparing: Multifocal invasive lobular carcinoma; extensive LCIS (2 foci of invasive carcinoma; largest measuring 3.0cm)\par \par 11/3/2021 [facility] mammo screen w gigi BI\par Questionable new asymmetry upper outer posterior right breast. Further evaluation with additional diagnostic mammographic views and ultrasound recommended. RECOMMENDATION: Additional Imaging.\par BI-RADS 0 - Incomplete: Needs Additional Imaging Evaluation\par \par 12/3/2021 US breast limited RT/ MG mammo diag w gigi RT\par Suspicious mass right breast. Ultrasound guided core needle biopsy is recommended for further evaluation, and will be scheduled.\par Susana in the office of Dr. Velazquez was notified of these results on 12/3/2021\par RECOMMENDATION: Ultrasound biopsy. BI-RADS 5- Highly Suggestive for Malignancy\par \par 12/9/2021 Pathology\par Right breast at 10:00 o'clock, 6 cmfn from nipple, needle core biopsy: Invasive mammary duct carcinoma, intermediate Vimal modified histological grade 6/9 ( T=3, N=2, M=1), measuring 1.0 cm in biopsy length. Ductal carcinoma in situ, is not present. ER 90% HI >90% Her2 negative\par \par 12/21/2021 MR breast \par Marked background parenchymal enhancement significantly lowers the sensitivity of breast MRI for detection of small enhancing lesions.\par A 2.1 cm biopsy-proven malignancy in the posterior 10:00 RIGHT breast. Additionally, there is suspicious asymmetric nonmass enhancement involving most of the upper right breast including the site of biopsy-proven malignancy in the upper outer breast and the upper inner quadrant. MR guided biopsy of a representative region in the upper inner quadrant is recommended ([id number]).\par No lymphadenopathy and no MR evidence of malignancy in the contralateral breast.\par RECOMMENDATION: MR guided biopsy.\par BI-RADS 4B - Suspicious Finding(s) - Moderate Suspicion for Malignancy\par \par We reviewed and discussed clinical examination. She understands that there is no evidence of recurrence. Recommendation to follow up with Medical Oncology. \par \par She understand and agrees to plan. All questions answered.

## 2022-06-30 NOTE — PHYSICAL EXAM
[Normal] : affect appropriate [de-identified] : supple [de-identified] : no palpable R breast mass or axillary adenopathy.

## 2022-06-30 NOTE — HISTORY OF PRESENT ILLNESS
[de-identified] : Ms. Harrington was diagnosed with breast cancer at age 46 in December 2021.\par \par She had a screening mammogram on 11/3/21 which showed a questionable new asymmetry  upper outer posterior right breast. \par Subsequent diagnostic mammogram/sonogram on 12/3/21 showed: 1.2 x 1.2 x 1.7 cm irregular mass at 10:00 right breast, 6 cmfn. No axillary adenopathy.\par \par On 12/9/21 right breast 10:00, 6 cmfn core biposy - IDC, grade 6/9, 1 cm in length. ER 90%, CA>90%, HER2 negative. \par \par On 12/20/21 MRI breast -Marked background parenchymal enhancement significantly lowers the sensitivity of breast MRI for detection of small enhancing lesions.\par A 2.1 cm biopsy-proven malignancy in the posterior 10:00 RIGHT breast. Additionally, there is suspicious asymmetric nonmass enhancement involving most of the upper right breast including the site of biopsy-proven malignancy in the upper outer breast and the upper inner quadrant. MR guided biopsy of a representative region in the upper inner quadrant is recommended ([id number]).\par No lymphadenopathy and no MR evidence of malignancy in the contralateral breast.\par \par PMHx: Denies \par FMHx: Maternal Great Aunt breast Cancer, Father prostate cancer dx early 60's \par Sx: Cystectomy  \par Socx: Social Alcohol Use, Non-smoker \par PCP: Dr. Yung Beverly in [place] \par \par Planning on b/l mastectomy\par LMS 12/26/2021\par GYN Dr. Bass, pap smear - 04/2021 \par Notes she has 3 children.     [de-identified] : Patient returns for follow-up. On Tamoxifen. Tolerating well.\par s/p revision of bilateral breast reconstruction and abdominal donor site 6/14/22. \par \par No hot flashes\par No mood changes \par No vaginal dryness \par Obtained MRI Abd demonstrating focal nodular hyperplasia. Following up with Hepatology NP, Britney Adams \par LMP 3/19/22. Reports spotting in 05/22 for a couple of days then stopped. \par \par

## 2022-06-30 NOTE — ASSESSMENT
[FreeTextEntry1] : 47 year old premenopausal female with stage IIA right breast invasive lobular carcinoma, classic type, multifocal (pT2N0) ER 90%, [place]>90%, HER2 negative. S/p bilateral mastectomy with RICH reconstruction on 2/9/22.\par Final pathology showed multifocal ILC, 2 foci measuring 3 cm and 0.7 cm each, margins negative but closest margin 0.3 mm away (deep), 0/3 lymph nodes. \par Oncotype Dx RS 14, chemo benefit <1%, distant risk of recurrence at 9 years with Clay is 4%.\par She was seen by rad-onc. \par Started Tamoxifen 3/31/22\par \par On Tamoxifen - Tolerating well, no s/e. \par Reviewed CBC today: WBC 7.2, Hgb 13.3, plts 254K\par \par Plan: \par - Continue Tamoxifen \par - Follow-up in 4 months

## 2022-07-08 LAB
ALBUMIN SERPL ELPH-MCNC: 4.5 G/DL
ALP BLD-CCNC: 52 U/L
ALT SERPL-CCNC: 10 U/L
ANION GAP SERPL CALC-SCNC: 11 MMOL/L
AST SERPL-CCNC: 19 U/L
BILIRUB SERPL-MCNC: 0.4 MG/DL
BUN SERPL-MCNC: 20 MG/DL
CALCIUM SERPL-MCNC: 9.4 MG/DL
CHLORIDE SERPL-SCNC: 104 MMOL/L
CO2 SERPL-SCNC: 25 MMOL/L
CREAT SERPL-MCNC: 0.89 MG/DL
EGFR: 80 ML/MIN/1.73M2
GLUCOSE SERPL-MCNC: 77 MG/DL
POTASSIUM SERPL-SCNC: 4.1 MMOL/L
PROT SERPL-MCNC: 6.6 G/DL
SODIUM SERPL-SCNC: 140 MMOL/L

## 2022-07-11 ENCOUNTER — APPOINTMENT (OUTPATIENT)
Dept: PLASTIC SURGERY | Facility: CLINIC | Age: 47
End: 2022-07-11

## 2022-07-11 VITALS — OXYGEN SATURATION: 100 % | HEART RATE: 55 BPM | DIASTOLIC BLOOD PRESSURE: 81 MMHG | SYSTOLIC BLOOD PRESSURE: 128 MMHG

## 2022-07-11 PROCEDURE — 99024 POSTOP FOLLOW-UP VISIT: CPT

## 2022-08-10 ENCOUNTER — NON-APPOINTMENT (OUTPATIENT)
Age: 47
End: 2022-08-10

## 2022-10-19 NOTE — BRIEF OPERATIVE NOTE - COMMENTS
this patient will be admitted

## 2022-10-25 ENCOUNTER — OUTPATIENT (OUTPATIENT)
Dept: OUTPATIENT SERVICES | Facility: HOSPITAL | Age: 47
LOS: 1 days | Discharge: HOME | End: 2022-10-25

## 2022-10-25 DIAGNOSIS — Z90.721 ACQUIRED ABSENCE OF OVARIES, UNILATERAL: Chronic | ICD-10-CM

## 2022-10-25 DIAGNOSIS — C50.919 MALIGNANT NEOPLASM OF UNSPECIFIED SITE OF UNSPECIFIED FEMALE BREAST: ICD-10-CM

## 2022-10-27 ENCOUNTER — APPOINTMENT (OUTPATIENT)
Dept: HEMATOLOGY ONCOLOGY | Facility: CLINIC | Age: 47
End: 2022-10-27

## 2022-10-27 ENCOUNTER — RESULT REVIEW (OUTPATIENT)
Age: 47
End: 2022-10-27

## 2022-10-27 VITALS
SYSTOLIC BLOOD PRESSURE: 123 MMHG | HEART RATE: 51 BPM | BODY MASS INDEX: 24.25 KG/M2 | WEIGHT: 142.03 LBS | OXYGEN SATURATION: 100 % | DIASTOLIC BLOOD PRESSURE: 82 MMHG | HEIGHT: 64 IN

## 2022-10-27 LAB
BASOPHILS # BLD AUTO: 0.1 K/UL — SIGNIFICANT CHANGE UP (ref 0–0.2)
BASOPHILS NFR BLD AUTO: 1.3 % — SIGNIFICANT CHANGE UP (ref 0–2)
EOSINOPHIL # BLD AUTO: 0.1 K/UL — SIGNIFICANT CHANGE UP (ref 0–0.5)
EOSINOPHIL NFR BLD AUTO: 1 % — SIGNIFICANT CHANGE UP (ref 0–6)
HCT VFR BLD CALC: 39.1 % — SIGNIFICANT CHANGE UP (ref 34.5–45)
HGB BLD-MCNC: 12.9 G/DL — SIGNIFICANT CHANGE UP (ref 11.5–15.5)
LYMPHOCYTES # BLD AUTO: 2 K/UL — SIGNIFICANT CHANGE UP (ref 1–3.3)
LYMPHOCYTES # BLD AUTO: 31.4 % — SIGNIFICANT CHANGE UP (ref 13–44)
MCHC RBC-ENTMCNC: 31 PG — SIGNIFICANT CHANGE UP (ref 27–34)
MCHC RBC-ENTMCNC: 32.9 G/DL — SIGNIFICANT CHANGE UP (ref 32–36)
MCV RBC AUTO: 94.1 FL — SIGNIFICANT CHANGE UP (ref 80–100)
MONOCYTES # BLD AUTO: 0.4 K/UL — SIGNIFICANT CHANGE UP (ref 0–0.9)
MONOCYTES NFR BLD AUTO: 5.6 % — SIGNIFICANT CHANGE UP (ref 2–14)
NEUTROPHILS # BLD AUTO: 3.8 K/UL — SIGNIFICANT CHANGE UP (ref 1.8–7.4)
NEUTROPHILS NFR BLD AUTO: 60.8 % — SIGNIFICANT CHANGE UP (ref 43–77)
PLATELET # BLD AUTO: 224 K/UL — SIGNIFICANT CHANGE UP (ref 150–400)
RBC # BLD: 4.16 M/UL — SIGNIFICANT CHANGE UP (ref 3.8–5.2)
RBC # FLD: 11.9 % — SIGNIFICANT CHANGE UP (ref 10.3–14.5)
WBC # BLD: 6.3 K/UL — SIGNIFICANT CHANGE UP (ref 3.8–10.5)
WBC # FLD AUTO: 6.3 K/UL — SIGNIFICANT CHANGE UP (ref 3.8–10.5)

## 2022-10-27 PROCEDURE — 99214 OFFICE O/P EST MOD 30 MIN: CPT

## 2022-10-27 RX ORDER — ASPIRIN ENTERIC COATED TABLETS 81 MG 81 MG/1
81 TABLET, DELAYED RELEASE ORAL
Qty: 21 | Refills: 0 | Status: DISCONTINUED | COMMUNITY
Start: 2022-02-04 | End: 2022-10-27

## 2022-10-27 RX ORDER — OXYCODONE AND ACETAMINOPHEN 5; 325 MG/1; MG/1
5-325 TABLET ORAL
Qty: 20 | Refills: 0 | Status: DISCONTINUED | COMMUNITY
Start: 2022-06-10 | End: 2022-10-27

## 2022-10-27 NOTE — ASSESSMENT
[FreeTextEntry1] : 47 year old premenopausal female with stage IIA right breast invasive lobular carcinoma, classic type, multifocal (pT2N0) ER 90%, [place]>90%, HER2 negative. S/p bilateral mastectomy with RICH reconstruction on 2/9/22.\par Final pathology showed multifocal ILC, 2 foci measuring 3 cm and 0.7 cm each, margins negative but closest margin 0.3 mm away (deep), 0/3 lymph nodes. \par Oncotype Dx RS 14, chemo benefit <1%, distant risk of recurrence at 9 years with Clay is 4%.\par She was seen by rad-onc. \par Started Tamoxifen 3/31/22\par \par On Tamoxifen - Tolerating well, no s/e.  LMP 8/2022. \par Reviewed CBC today: 10/27/2022 Today's CBC: WBC 6.3 K, HGB 12.9 g, HCT 39.1 %,  K, ANC 3800\par \par Plan: \par - Continue Tamoxifen \par -yearly GYN visit - next due in 4/2023\par - Follow-up in 5 months, in 03/2023

## 2022-10-27 NOTE — HISTORY OF PRESENT ILLNESS
[de-identified] : Ms. Harrington was diagnosed with breast cancer at age 46 in December 2021.\par \par She had a screening mammogram on 11/3/21 which showed a questionable new asymmetry  upper outer posterior right breast. \par Subsequent diagnostic mammogram/sonogram on 12/3/21 showed: 1.2 x 1.2 x 1.7 cm irregular mass at 10:00 right breast, 6 cmfn. No axillary adenopathy.\par \par On 12/9/21 right breast 10:00, 6 cmfn core biposy - IDC, grade 6/9, 1 cm in length. ER 90%, CA>90%, HER2 negative. \par \par On 12/20/21 MRI breast -Marked background parenchymal enhancement significantly lowers the sensitivity of breast MRI for detection of small enhancing lesions.\par A 2.1 cm biopsy-proven malignancy in the posterior 10:00 RIGHT breast. Additionally, there is suspicious asymmetric nonmass enhancement involving most of the upper right breast including the site of biopsy-proven malignancy in the upper outer breast and the upper inner quadrant. MR guided biopsy of a representative region in the upper inner quadrant is recommended ([id number]).\par No lymphadenopathy and no MR evidence of malignancy in the contralateral breast.\par \par PMHx: Denies \par FMHx: Maternal Great Aunt breast Cancer, Father prostate cancer dx early 60's \par Sx: Cystectomy  \par Socx: Social Alcohol Use, Non-smoker \par PCP: Dr. Yung Beverly in [place] \par \par Planning on b/l mastectomy\par LMS 12/26/2021\par GYN Dr. Bass, pap smear - 04/2021 \par Notes she has 3 children.     [de-identified] : Patient returns for follow-up with partner. On Tamoxifen. Tolerating well.\par S/p revision of bilateral breast reconstruction and abdominal donor site 6/14/22. \par Reports recently feeling improvement in energy levels \par LMP 8/22/22, priorly, regular periods, denies spotting \par No hot flashes\par No mood changes \par No vaginal dryness \par No infections\par No new medications\par \par Has yet to receive initial colonoscopy

## 2022-10-27 NOTE — ADDENDUM
[FreeTextEntry1] : Documented by Hortencia Montes acting as scribe for Dr. Rubio on 10/27/2022.\par \par All Medical record entries made by the Scribe were at my, Dr. Rubio, direction and personally dictated by me on 10/27/2022. I have reviewed the chart and agree that the record accurately reflects my personal performance of the history, physical exam, assessment and plan. I have also personally directed, reviewed, and agreed with the discharge instructions.

## 2022-10-27 NOTE — CONSULT LETTER
[Dear  ___] : Dear  [unfilled], [Consult Letter:] : I had the pleasure of evaluating your patient, [unfilled]. [Please see my note below.] : Please see my note below. [Consult Closing:] : Thank you very much for allowing me to participate in the care of this patient.  If you have any questions, please do not hesitate to contact me. [Sincerely,] : Sincerely, [DrSylvester  ___] : Dr. HATCH [FreeTextEntry3] : Diane Rubio MD\par Medical Oncology/Hematology\par [facility] [place], [facility]\par \par \par [facility]\par

## 2022-10-27 NOTE — PHYSICAL EXAM
[Normal] : affect appropriate [de-identified] : supple [de-identified] : no palpable R breast mass or axillary adenopathy.

## 2022-11-01 LAB
ALBUMIN SERPL ELPH-MCNC: 4.5 G/DL
ALP BLD-CCNC: 45 U/L
ALT SERPL-CCNC: 12 U/L
ANION GAP SERPL CALC-SCNC: 10 MMOL/L
AST SERPL-CCNC: 23 U/L
BILIRUB SERPL-MCNC: 0.4 MG/DL
BUN SERPL-MCNC: 18 MG/DL
CALCIUM SERPL-MCNC: 9.6 MG/DL
CHLORIDE SERPL-SCNC: 105 MMOL/L
CO2 SERPL-SCNC: 25 MMOL/L
CREAT SERPL-MCNC: 0.89 MG/DL
EGFR: 80 ML/MIN/1.73M2
GLUCOSE SERPL-MCNC: 93 MG/DL
POTASSIUM SERPL-SCNC: 4.7 MMOL/L
PROT SERPL-MCNC: 6.5 G/DL
SODIUM SERPL-SCNC: 139 MMOL/L

## 2022-12-20 ENCOUNTER — APPOINTMENT (OUTPATIENT)
Dept: SURGERY | Facility: CLINIC | Age: 47
End: 2022-12-20

## 2022-12-22 ENCOUNTER — APPOINTMENT (OUTPATIENT)
Dept: PLASTIC SURGERY | Facility: CLINIC | Age: 47
End: 2022-12-22

## 2023-01-03 ENCOUNTER — APPOINTMENT (OUTPATIENT)
Dept: GASTROENTEROLOGY | Facility: CLINIC | Age: 48
End: 2023-01-03

## 2023-01-03 ENCOUNTER — NON-APPOINTMENT (OUTPATIENT)
Age: 48
End: 2023-01-03

## 2023-01-04 ENCOUNTER — APPOINTMENT (OUTPATIENT)
Dept: GASTROENTEROLOGY | Facility: CLINIC | Age: 48
End: 2023-01-04
Payer: COMMERCIAL

## 2023-01-04 VITALS
RESPIRATION RATE: 16 BRPM | HEART RATE: 53 BPM | DIASTOLIC BLOOD PRESSURE: 78 MMHG | TEMPERATURE: 97.7 F | WEIGHT: 142 LBS | HEIGHT: 64 IN | OXYGEN SATURATION: 98 % | SYSTOLIC BLOOD PRESSURE: 110 MMHG | BODY MASS INDEX: 24.24 KG/M2

## 2023-01-04 DIAGNOSIS — Z12.11 ENCOUNTER FOR SCREENING FOR MALIGNANT NEOPLASM OF COLON: ICD-10-CM

## 2023-01-04 PROCEDURE — 82272 OCCULT BLD FECES 1-3 TESTS: CPT

## 2023-01-04 PROCEDURE — 99204 OFFICE O/P NEW MOD 45 MIN: CPT

## 2023-01-04 NOTE — ASSESSMENT
[FreeTextEntry1] : A/P\par Family history of colon polyps–mother\par  I discussed the risks and benefits of colonoscopy and patient was given opportunity to ask questions. Colonoscopy to r/o colon cancer, polyps, AVM's. Patient is medically optimized for the procedure\par MiraLAX–prep\par CBC CMP normal in October 2022

## 2023-01-04 NOTE — REASON FOR VISIT
[Initial Evaluation] : an initial evaluation [FreeTextEntry1] : Family history of polyps in her mother

## 2023-01-04 NOTE — HISTORY OF PRESENT ILLNESS
[FreeTextEntry1] : Patient has a history of breast cancer in 2021.  She required mastectomy and is currently on tamoxifen.  No other chronic medical problem\par    Patient's mother had colon polyps in her 50s.  Patient has no constipation diarrhea black stools bloody stools abdominal pain unintentional weight loss or change in bowel habits.  He has never had a colonoscopy

## 2023-01-04 NOTE — PHYSICAL EXAM
[Alert] : alert [Normal Voice/Communication] : normal voice/communication [Healthy Appearing] : healthy appearing [No Acute Distress] : no acute distress [Heart Rate And Rhythm] : heart rate was normal and rhythm regular [Normal S1, S2] : normal S1 and S2 [Murmurs] : no murmurs [Bowel Sounds] : normal bowel sounds [Abdomen Tenderness] : non-tender [No Masses] : no abdominal mass palpated [Abdomen Soft] : soft [Normal Sphincter Tone] : normal sphincter tone [No External Hemorrhoid] : no external hemorrhoids [No Rectal Mass] : no rectal mass [Occult Blood] : negative occult blood [Oriented To Time, Place, And Person] : oriented to person, place, and time

## 2023-01-11 ENCOUNTER — APPOINTMENT (OUTPATIENT)
Dept: SURGERY | Facility: CLINIC | Age: 48
End: 2023-01-11
Payer: COMMERCIAL

## 2023-01-11 VITALS
BODY MASS INDEX: 24.24 KG/M2 | SYSTOLIC BLOOD PRESSURE: 108 MMHG | DIASTOLIC BLOOD PRESSURE: 64 MMHG | HEIGHT: 64 IN | WEIGHT: 142 LBS

## 2023-01-11 PROCEDURE — 99213 OFFICE O/P EST LOW 20 MIN: CPT

## 2023-01-11 NOTE — ASSESSMENT
[FreeTextEntry1] : 47 year old premenopausal female with a hx of stage 2 right breast cancer treated with bilateral mastectomy and right SLnB, currently on Tamoxifen and tolerating well.\par \par CBE is benign. We discussed the importance of Q6 month CBE and self breast awareness. We discusse the role of US and MRI post-mastectomy. I will see her back in 6 months for her next CBE.\par \par \par Patient verbalized understanding for all treatment plans discussed. All of her questions were answered to the best of my ability. She was encouraged to call the office if any questions or concerns or come in sooner if needed.\par \par \par PLAN\par 1. Follow up in 6 months\par 2. Continue Tamoxifen\par 3. Follow up with Dr. Duncan\par 4. Follow up with Dr. Rubio

## 2023-01-11 NOTE — ADDENDUM
[FreeTextEntry1] : This note was written by Ashli Mckay, acting as the  for Dr. Felix. This note accurately reflects the work and decisions made by Dr. Felix.

## 2023-01-11 NOTE — PHYSICAL EXAM
[Normocephalic] : normocephalic [Atraumatic] : atraumatic [EOMI] : extra ocular movement intact [PERRL] : pupils equal, round and reactive to light [Sclera nonicteric] : sclera nonicteric [Supple] : supple [No Supraclavicular Adenopathy] : no supraclavicular adenopathy [Examined in the supine and seated position] : examined in the supine and seated position [No dominant masses] : no dominant masses in right breast  [No dominant masses] : no dominant masses left breast [No Nipple Retraction] : no left nipple retraction [No Nipple Discharge] : no left nipple discharge [No Axillary Lymphadenopathy] : no left axillary lymphadenopathy [No Edema] : no edema [No Rashes] : no rashes [No Ulceration] : no ulceration [No Cervical Adenopathy] : no cervical adenopathy [None] : no ptosis [de-identified] : Bilateral breasts are surgically absent with flaps well incorporated.

## 2023-01-11 NOTE — HISTORY OF PRESENT ILLNESS
[FreeTextEntry1] : Oncology History:\par 1. Right breast multifocal pT2N0 IDC grade 2 ER 90% MO >90% Her2 negative.\par     - Underwent bilateral NSM with right SLNB and RICH reconstruction 02/09/22\par      FINAL PATH: Left breast; nipple sparing: benign findings. Right breast; nipple sparing: Multifocal invasive lobular carcinoma; extensive LCIS (2 foci of invasive carcinoma; largest measuring 3.0 cm). 0/2 right sentinel lymph nodes. \par     -Oncotype score was 14\par     -Tamoxifen started 3/31/22\par     -Declined PMRT \par \par \par CARE TEAM\par Med Onc - [facility]\par Rad Onc - [facility]\par Plastics - [facility]\par \par \par INTERVAL HISTORY:\par Patient presents for CBE. She had a revisional surgery w/Dr. Duncan in June. No plans for further revision at this time. She is on the Tamoxifen and tolerating well. She does not do SBE. \par \par \par HPI: \par Patient initially presented to Dr. Packer in December 2021 for a new right breast cancer. Screening imaging demonstrated a new questionable asymmetry in the right breast. Call back mammogram recommended for biopsy of the 1.7 cm nodule. Biopsy was performed on 12/9/2021 demonstrating IDC grade 2 ER 90% MO >90% Her2 negative.\par \par \par \par IMAGING:\par - No recent imaging.\par \par \par

## 2023-03-05 ENCOUNTER — TRANSCRIPTION ENCOUNTER (OUTPATIENT)
Age: 48
End: 2023-03-05

## 2023-03-06 ENCOUNTER — OUTPATIENT (OUTPATIENT)
Dept: OUTPATIENT SERVICES | Facility: HOSPITAL | Age: 48
LOS: 1 days | End: 2023-03-06
Payer: COMMERCIAL

## 2023-03-06 ENCOUNTER — APPOINTMENT (OUTPATIENT)
Dept: GASTROENTEROLOGY | Facility: GI CENTER | Age: 48
End: 2023-03-06
Payer: COMMERCIAL

## 2023-03-06 DIAGNOSIS — Z90.721 ACQUIRED ABSENCE OF OVARIES, UNILATERAL: Chronic | ICD-10-CM

## 2023-03-06 DIAGNOSIS — Z83.71 FAMILY HISTORY OF COLONIC POLYPS: ICD-10-CM

## 2023-03-06 DIAGNOSIS — Z12.11 ENCOUNTER FOR SCREENING FOR MALIGNANT NEOPLASM OF COLON: ICD-10-CM

## 2023-03-06 PROCEDURE — G0105: CPT

## 2023-03-06 PROCEDURE — 45378 DIAGNOSTIC COLONOSCOPY: CPT | Mod: 33

## 2023-03-06 NOTE — ASSESSMENT
[FreeTextEntry1] : A/P\par family hx of colo polyps\par  I discussed the risks and benefits of colonoscopy and patient was given opportunity to ask questions. Colonoscopy to r/o colon cancer, polyps, AVM's. Patient is medically optimized for the procedure\par

## 2023-03-06 NOTE — PHYSICAL EXAM
[Alert] : alert [Normal Voice/Communication] : normal voice/communication [Healthy Appearing] : healthy appearing [No Respiratory Distress] : no respiratory distress [No Acc Muscle Use] : no accessory muscle use [Respiration, Rhythm And Depth] : normal respiratory rhythm and effort [Auscultation Breath Sounds / Voice Sounds] : lungs were clear to auscultation bilaterally [Heart Rate And Rhythm] : heart rate was normal and rhythm regular [Normal S1, S2] : normal S1 and S2 [Bowel Sounds] : normal bowel sounds [Abdomen Tenderness] : non-tender [No Masses] : no abdominal mass palpated [Abdomen Soft] : soft

## 2023-03-15 ENCOUNTER — OUTPATIENT (OUTPATIENT)
Dept: OUTPATIENT SERVICES | Facility: HOSPITAL | Age: 48
LOS: 1 days | Discharge: HOME | End: 2023-03-15

## 2023-03-15 DIAGNOSIS — C50.919 MALIGNANT NEOPLASM OF UNSPECIFIED SITE OF UNSPECIFIED FEMALE BREAST: ICD-10-CM

## 2023-03-15 DIAGNOSIS — Z90.721 ACQUIRED ABSENCE OF OVARIES, UNILATERAL: Chronic | ICD-10-CM

## 2023-03-16 ENCOUNTER — RESULT REVIEW (OUTPATIENT)
Age: 48
End: 2023-03-16

## 2023-03-16 ENCOUNTER — APPOINTMENT (OUTPATIENT)
Dept: HEMATOLOGY ONCOLOGY | Facility: CLINIC | Age: 48
End: 2023-03-16
Payer: COMMERCIAL

## 2023-03-16 VITALS
DIASTOLIC BLOOD PRESSURE: 72 MMHG | HEIGHT: 64 IN | WEIGHT: 141 LBS | TEMPERATURE: 97.5 F | OXYGEN SATURATION: 100 % | BODY MASS INDEX: 24.07 KG/M2 | HEART RATE: 52 BPM | SYSTOLIC BLOOD PRESSURE: 109 MMHG

## 2023-03-16 LAB
BASOPHILS # BLD AUTO: 0.1 K/UL — SIGNIFICANT CHANGE UP (ref 0–0.2)
BASOPHILS NFR BLD AUTO: 1.3 % — SIGNIFICANT CHANGE UP (ref 0–2)
EOSINOPHIL # BLD AUTO: 0.1 K/UL — SIGNIFICANT CHANGE UP (ref 0–0.5)
EOSINOPHIL NFR BLD AUTO: 1 % — SIGNIFICANT CHANGE UP (ref 0–6)
HCT VFR BLD CALC: 39.6 % — SIGNIFICANT CHANGE UP (ref 34.5–45)
HGB BLD-MCNC: 13.8 G/DL — SIGNIFICANT CHANGE UP (ref 11.5–15.5)
LYMPHOCYTES # BLD AUTO: 1.8 K/UL — SIGNIFICANT CHANGE UP (ref 1–3.3)
LYMPHOCYTES # BLD AUTO: 31.2 % — SIGNIFICANT CHANGE UP (ref 13–44)
MCHC RBC-ENTMCNC: 31.2 PG — SIGNIFICANT CHANGE UP (ref 27–34)
MCHC RBC-ENTMCNC: 35 G/DL — SIGNIFICANT CHANGE UP (ref 32–36)
MCV RBC AUTO: 89.2 FL — SIGNIFICANT CHANGE UP (ref 80–100)
MONOCYTES # BLD AUTO: 0.2 K/UL — SIGNIFICANT CHANGE UP (ref 0–0.9)
MONOCYTES NFR BLD AUTO: 4.2 % — SIGNIFICANT CHANGE UP (ref 2–14)
NEUTROPHILS # BLD AUTO: 3.6 K/UL — SIGNIFICANT CHANGE UP (ref 1.8–7.4)
NEUTROPHILS NFR BLD AUTO: 62.3 % — SIGNIFICANT CHANGE UP (ref 43–77)
PLATELET # BLD AUTO: 224 K/UL — SIGNIFICANT CHANGE UP (ref 150–400)
RBC # BLD: 4.43 M/UL — SIGNIFICANT CHANGE UP (ref 3.8–5.2)
RBC # FLD: 12.2 % — SIGNIFICANT CHANGE UP (ref 10.3–14.5)
WBC # BLD: 5.8 K/UL — SIGNIFICANT CHANGE UP (ref 3.8–10.5)
WBC # FLD AUTO: 5.8 K/UL — SIGNIFICANT CHANGE UP (ref 3.8–10.5)

## 2023-03-16 PROCEDURE — 99214 OFFICE O/P EST MOD 30 MIN: CPT

## 2023-03-21 LAB
ALBUMIN SERPL ELPH-MCNC: 4.4 G/DL
ALP BLD-CCNC: 53 U/L
ALT SERPL-CCNC: 11 U/L
ANION GAP SERPL CALC-SCNC: 10 MMOL/L
AST SERPL-CCNC: 21 U/L
BILIRUB SERPL-MCNC: 0.4 MG/DL
BUN SERPL-MCNC: 21 MG/DL
CALCIUM SERPL-MCNC: 9.7 MG/DL
CHLORIDE SERPL-SCNC: 104 MMOL/L
CO2 SERPL-SCNC: 25 MMOL/L
CREAT SERPL-MCNC: 0.9 MG/DL
EGFR: 79 ML/MIN/1.73M2
GLUCOSE SERPL-MCNC: 78 MG/DL
POTASSIUM SERPL-SCNC: 4.4 MMOL/L
PROT SERPL-MCNC: 6.6 G/DL
SODIUM SERPL-SCNC: 140 MMOL/L

## 2023-04-02 NOTE — CONSULT LETTER
[Dear  ___] : Dear  [unfilled], [Consult Letter:] : I had the pleasure of evaluating your patient, [unfilled]. [Please see my note below.] : Please see my note below. [Consult Closing:] : Thank you very much for allowing me to participate in the care of this patient.  If you have any questions, please do not hesitate to contact me. [Sincerely,] : Sincerely, [DrSylvester  ___] : Dr. HATCH [FreeTextEntry3] : Diane Rubio MD\par Medical Oncology/Hematology\par [facility] [place], [facility]\par \par \par [facility]\par

## 2023-04-02 NOTE — ASSESSMENT
[FreeTextEntry1] : 48 year old premenopausal female with stage IIA right breast invasive lobular carcinoma, classic type, multifocal (pT2N0) ER 90%, [place]>90%, HER2 negative. S/p bilateral mastectomy with RICH reconstruction on 2/9/22.\par Final pathology showed multifocal ILC, 2 foci measuring 3 cm and 0.7 cm each, margins negative but closest margin 0.3 mm away (deep), 0/3 lymph nodes. \par Oncotype Dx RS 14, chemo benefit <1%, distant risk of recurrence at 9 years with Clay is 4%.\par She was seen by rad-onc. \par Started Tamoxifen 3/31/22\par \par \par Plan: \par -Continue Tamoxifen\par -Continue f/u with GYN every 6 months-1 year\par -Follow up with breast surgeon Dr Felix \par -Follow up with GI Dr. Alexandre \par -Follow-up in 5 months with Dr Rubio

## 2023-04-02 NOTE — PHYSICAL EXAM
[Normal] : no JVD, no calf tenderness, venous stasis changes, varices [de-identified] : supple [de-identified] : soft, nontender  [de-identified] : No palpable breast mass or axillary lymphadenopathy bilaterally

## 2023-04-02 NOTE — HISTORY OF PRESENT ILLNESS
[de-identified] : Ms. Harrington was diagnosed with breast cancer at age 46 in December 2021.\par \par She had a screening mammogram on 11/3/21 which showed a questionable new asymmetry  upper outer posterior right breast. \par Subsequent diagnostic mammogram/sonogram on 12/3/21 showed: 1.2 x 1.2 x 1.7 cm irregular mass at 10:00 right breast, 6 cmfn. No axillary adenopathy.\par \par On 12/9/21 right breast 10:00, 6 cmfn core biposy - IDC, grade 6/9, 1 cm in length. ER 90%, WV>90%, HER2 negative. \par \par On 12/20/21 MRI breast -Marked background parenchymal enhancement significantly lowers the sensitivity of breast MRI for detection of small enhancing lesions.\par A 2.1 cm biopsy-proven malignancy in the posterior 10:00 RIGHT breast. Additionally, there is suspicious asymmetric nonmass enhancement involving most of the upper right breast including the site of biopsy-proven malignancy in the upper outer breast and the upper inner quadrant. MR guided biopsy of a representative region in the upper inner quadrant is recommended ([id number]).\par No lymphadenopathy and no MR evidence of malignancy in the contralateral breast.\par \par PMHx: Denies \par FMHx: Maternal Great Aunt breast Cancer, Father prostate cancer dx early 60's \par Sx: Cystectomy  \par Socx: Social Alcohol Use, Non-smoker \par PCP: Dr. Yung Beverly in [place] \par \par Planning on b/l mastectomy\par LMS 12/26/2021\par GYN Dr. Bass, pap smear - 04/2021 \par Notes she has 3 children.     [de-identified] : Patient returns for follow-up\par \par Notes compliance to Tamoxifen daily \par LMP 8/2022, denies vaginal bleeding/spotting. Follows up with GYN Dr Cee \par Denies hot flashes\par Denies mood changes \par S/p routine screening colonoscopy on 3/6/23 with GI Dr Alexandre, notes normal findings\par Denies headache, dizziness\par Denies dyspnea, cough, leg swelling\par Denies abdominal pain, nausea, vomiting\par Feels well

## 2023-04-24 ENCOUNTER — NON-APPOINTMENT (OUTPATIENT)
Age: 48
End: 2023-04-24

## 2023-04-24 ENCOUNTER — APPOINTMENT (OUTPATIENT)
Dept: OBGYN | Facility: CLINIC | Age: 48
End: 2023-04-24
Payer: COMMERCIAL

## 2023-04-24 VITALS
HEIGHT: 64 IN | SYSTOLIC BLOOD PRESSURE: 116 MMHG | DIASTOLIC BLOOD PRESSURE: 80 MMHG | WEIGHT: 139 LBS | BODY MASS INDEX: 23.73 KG/M2

## 2023-04-24 DIAGNOSIS — Z90.13 ACQUIRED ABSENCE OF BILATERAL BREASTS AND NIPPLES: ICD-10-CM

## 2023-04-24 PROCEDURE — 99396 PREV VISIT EST AGE 40-64: CPT

## 2023-04-25 NOTE — HISTORY OF PRESENT ILLNESS
[FreeTextEntry1] : 47 yo p2 here for annual exam. sp charles mastectomies for br ca.  doing well\par son graduating from [employer], moving to [place] for  vet school\par meds- tamoxifen, no problems.\par lmp april, first since august.\par denies dc pain bladder problems\par daughter studying [employer] engineering at [employer]\par

## 2023-04-26 LAB — HPV HIGH+LOW RISK DNA PNL CVX: NOT DETECTED

## 2023-05-01 LAB — CYTOLOGY CVX/VAG DOC THIN PREP: NORMAL

## 2023-08-09 ENCOUNTER — OUTPATIENT (OUTPATIENT)
Dept: OUTPATIENT SERVICES | Facility: HOSPITAL | Age: 48
LOS: 1 days | Discharge: HOME | End: 2023-08-09

## 2023-08-09 DIAGNOSIS — C50.919 MALIGNANT NEOPLASM OF UNSPECIFIED SITE OF UNSPECIFIED FEMALE BREAST: ICD-10-CM

## 2023-08-09 DIAGNOSIS — Z90.721 ACQUIRED ABSENCE OF OVARIES, UNILATERAL: Chronic | ICD-10-CM

## 2023-08-15 ENCOUNTER — APPOINTMENT (OUTPATIENT)
Dept: HEMATOLOGY ONCOLOGY | Facility: CLINIC | Age: 48
End: 2023-08-15
Payer: COMMERCIAL

## 2023-08-15 ENCOUNTER — RESULT REVIEW (OUTPATIENT)
Age: 48
End: 2023-08-15

## 2023-08-15 VITALS
SYSTOLIC BLOOD PRESSURE: 130 MMHG | TEMPERATURE: 97.2 F | WEIGHT: 138.06 LBS | BODY MASS INDEX: 23.57 KG/M2 | HEIGHT: 64 IN | HEART RATE: 53 BPM | DIASTOLIC BLOOD PRESSURE: 85 MMHG | OXYGEN SATURATION: 98 %

## 2023-08-15 DIAGNOSIS — C50.911 MALIGNANT NEOPLASM OF UNSPECIFIED SITE OF RIGHT FEMALE BREAST: ICD-10-CM

## 2023-08-15 LAB
BASOPHILS # BLD AUTO: 0.1 K/UL — SIGNIFICANT CHANGE UP (ref 0–0.2)
BASOPHILS NFR BLD AUTO: 1.6 % — SIGNIFICANT CHANGE UP (ref 0–2)
EOSINOPHIL # BLD AUTO: 0.1 K/UL — SIGNIFICANT CHANGE UP (ref 0–0.5)
EOSINOPHIL NFR BLD AUTO: 1.6 % — SIGNIFICANT CHANGE UP (ref 0–6)
HCT VFR BLD CALC: 37.2 % — SIGNIFICANT CHANGE UP (ref 34.5–45)
HGB BLD-MCNC: 13 G/DL — SIGNIFICANT CHANGE UP (ref 11.5–15.5)
LYMPHOCYTES # BLD AUTO: 1.7 K/UL — SIGNIFICANT CHANGE UP (ref 1–3.3)
LYMPHOCYTES # BLD AUTO: 30.9 % — SIGNIFICANT CHANGE UP (ref 13–44)
MCHC RBC-ENTMCNC: 31.3 PG — SIGNIFICANT CHANGE UP (ref 27–34)
MCHC RBC-ENTMCNC: 34.8 G/DL — SIGNIFICANT CHANGE UP (ref 32–36)
MCV RBC AUTO: 89.7 FL — SIGNIFICANT CHANGE UP (ref 80–100)
MONOCYTES # BLD AUTO: 0.2 K/UL — SIGNIFICANT CHANGE UP (ref 0–0.9)
MONOCYTES NFR BLD AUTO: 3.9 % — SIGNIFICANT CHANGE UP (ref 2–14)
NEUTROPHILS # BLD AUTO: 3.4 K/UL — SIGNIFICANT CHANGE UP (ref 1.8–7.4)
NEUTROPHILS NFR BLD AUTO: 62.1 % — SIGNIFICANT CHANGE UP (ref 43–77)
PLATELET # BLD AUTO: 173 K/UL — SIGNIFICANT CHANGE UP (ref 150–400)
RBC # BLD: 4.15 M/UL — SIGNIFICANT CHANGE UP (ref 3.8–5.2)
RBC # FLD: 11.7 % — SIGNIFICANT CHANGE UP (ref 10.3–14.5)
WBC # BLD: 5.4 K/UL — SIGNIFICANT CHANGE UP (ref 3.8–10.5)
WBC # FLD AUTO: 5.4 K/UL — SIGNIFICANT CHANGE UP (ref 3.8–10.5)

## 2023-08-15 PROCEDURE — 99214 OFFICE O/P EST MOD 30 MIN: CPT

## 2023-08-15 RX ORDER — TAMOXIFEN CITRATE 20 MG/1
20 TABLET, FILM COATED ORAL DAILY
Qty: 90 | Refills: 3 | Status: ACTIVE | COMMUNITY
Start: 2022-03-31 | End: 1900-01-01

## 2023-08-15 NOTE — ASSESSMENT
[FreeTextEntry1] : 48 year old premenopausal female with stage IIA right breast invasive lobular carcinoma, classic type, multifocal (pT2N0) ER 90%, HI>90%, HER2 negative. S/p bilateral mastectomy with RICH reconstruction on 2/9/22. Final pathology showed multifocal ILC, 2 foci measuring 3 cm and 0.7 cm each, margins negative but closest margin 0.3 mm away (deep), 0/3 lymph nodes.  Oncotype Dx RS 14, chemo benefit <1%, distant risk of recurrence at 9 years with Clay is 4%. She was seen by rad-onc.  Started Tamoxifen 3/31/22  LMP 4/2023 No side effects related to Tamoxifen.  Reviewed today's CBC WBC 5.4, HGB 13.0, HCT 37.2, PLT, 173  Plan:  -Continue Tamoxifen, rx sent -Continue f/u with GYN every 6 months-1 year -Follow-up in 6 months

## 2023-08-15 NOTE — ADDENDUM
[FreeTextEntry1] : Documented by Jany Mansfield acting as scribe for  on 08/15/2023   All Medical record entries made by the Scribe were at my, 's , direction and personally dictated by me on 08/15/2023 . I have reviewed the chart and agree that the record accurately reflects my personal performance of the history, physical exam, assessment and plan. I have also personally directed, reviewed, and agreed with the discharge instructions.

## 2023-08-15 NOTE — HISTORY OF PRESENT ILLNESS
[de-identified] : Ms. Harrington was diagnosed with breast cancer at age 46 in December 2021.\par  \par  She had a screening mammogram on 11/3/21 which showed a questionable new asymmetry  upper outer posterior right breast. \par  Subsequent diagnostic mammogram/sonogram on 12/3/21 showed: 1.2 x 1.2 x 1.7 cm irregular mass at 10:00 right breast, 6 cmfn. No axillary adenopathy.\par  \par  On 12/9/21 right breast 10:00, 6 cmfn core biposy - IDC, grade 6/9, 1 cm in length. ER 90%, MA>90%, HER2 negative. \par  \par  On 12/20/21 MRI breast -Marked background parenchymal enhancement significantly lowers the sensitivity of breast MRI for detection of small enhancing lesions.\par  A 2.1 cm biopsy-proven malignancy in the posterior 10:00 RIGHT breast. Additionally, there is suspicious asymmetric nonmass enhancement involving most of the upper right breast including the site of biopsy-proven malignancy in the upper outer breast and the upper inner quadrant. MR guided biopsy of a representative region in the upper inner quadrant is recommended ([id number]).\par  No lymphadenopathy and no MR evidence of malignancy in the contralateral breast.\par  \par  PMHx: Denies \par  FMHx: Maternal Great Aunt breast Cancer, Father prostate cancer dx early 60's \par  Sx: Cystectomy  \par  Socx: Social Alcohol Use, Non-smoker \par  PCP: Dr. Yung Beverly in [place] \par  \par  Planning on b/l mastectomy\par  LMS 12/26/2021\par  GYN Dr. Bass, pap smear - 04/2021 \par  Notes she has 3 children.     [de-identified] : Patient returns for follow-up Notes compliance to Tamoxifen daily  LMP 4/2023. Follows up with GYN Dr Cee Last seen 4/24/23 Denies vaginal dryness Denies hot flashes Denies mood changes  S/p routine screening colonoscopy on 3/6/23 with GI Dr Alexandre, notes normal findings Denies headache, dizziness Denies dyspnea, cough, leg swelling Denies abdominal pain, nausea, vomiting, pain  Feels well

## 2023-08-15 NOTE — PHYSICAL EXAM
[Normal] : affect appropriate [de-identified] : supple [de-identified] : No edema [de-identified] : No palpable breast mass or axillary lymphadenopathy bilaterally

## 2023-08-17 LAB
ALBUMIN SERPL ELPH-MCNC: 4.5 G/DL
ALP BLD-CCNC: 57 U/L
ALT SERPL-CCNC: 13 U/L
ANION GAP SERPL CALC-SCNC: 9 MMOL/L
AST SERPL-CCNC: 23 U/L
BILIRUB SERPL-MCNC: 0.5 MG/DL
BUN SERPL-MCNC: 14 MG/DL
CALCIUM SERPL-MCNC: 9.5 MG/DL
CHLORIDE SERPL-SCNC: 105 MMOL/L
CO2 SERPL-SCNC: 26 MMOL/L
CREAT SERPL-MCNC: 0.96 MG/DL
EGFR: 73 ML/MIN/1.73M2
GLUCOSE SERPL-MCNC: 82 MG/DL
POTASSIUM SERPL-SCNC: 4.2 MMOL/L
PROT SERPL-MCNC: 6.5 G/DL
SODIUM SERPL-SCNC: 141 MMOL/L

## 2023-09-06 ENCOUNTER — APPOINTMENT (OUTPATIENT)
Dept: SURGERY | Facility: CLINIC | Age: 48
End: 2023-09-06
Payer: COMMERCIAL

## 2023-09-06 VITALS
BODY MASS INDEX: 23.56 KG/M2 | DIASTOLIC BLOOD PRESSURE: 68 MMHG | WEIGHT: 138 LBS | HEIGHT: 64 IN | SYSTOLIC BLOOD PRESSURE: 114 MMHG

## 2023-09-06 PROCEDURE — 99213 OFFICE O/P EST LOW 20 MIN: CPT

## 2023-09-06 NOTE — HISTORY OF PRESENT ILLNESS
[FreeTextEntry1] : Oncology History: 1. Right breast multifocal pT2N0 IDC grade 2 ER 90% AL >90% Her2 negative.     - Underwent bilateral NSM with right SLNB and RICH reconstruction 02/09/22      FINAL PATH: Left breast; nipple sparing: benign findings. Right breast; nipple sparing: Multifocal invasive lobular carcinoma; extensive LCIS (2 foci of invasive carcinoma; largest measuring 3.0 cm). 0/2 right sentinel lymph nodes.      -Oncotype score was 14     -Tamoxifen started 3/31/22     -Declined PMRT      -Revision of bilateral breast recon and abdominal site 6/14/22  CARE TEAM Med Onc - [facility] Rad Onc - [facility] - [facility]   INTERVAL HISTORY: (01/11/23): Patient presents for CBE. She had a revisional surgery w/Dr. Duncan in June. No plans for further revision at this time. She is on the Tamoxifen and tolerating well. She does not do SBE.   (09/06/23): Patient presents for 6 month follow up. On tamoxifen tolerating well. No breast complaints at this time.    HPI:  Patient initially presented to Dr. Packer in December 2021 for a new right breast cancer. Screening imaging demonstrated a new questionable asymmetry in the right breast. Call back mammogram recommended for biopsy of the 1.7 cm nodule. Biopsy was performed on 12/9/2021 demonstrating IDC grade 2 ER 90% AL >90% Her2 negative.    IMAGING: - No recent imaging.

## 2023-09-06 NOTE — PHYSICAL EXAM
[Normocephalic] : normocephalic [Atraumatic] : atraumatic [EOMI] : extra ocular movement intact [PERRL] : pupils equal, round and reactive to light [Sclera nonicteric] : sclera nonicteric [Supple] : supple [No Supraclavicular Adenopathy] : no supraclavicular adenopathy [No Cervical Adenopathy] : no cervical adenopathy [Examined in the supine and seated position] : examined in the supine and seated position [None] : no ptosis [No dominant masses] : no dominant masses in right breast  [No dominant masses] : no dominant masses left breast [No Nipple Retraction] : no left nipple retraction [No Nipple Discharge] : no left nipple discharge [No Axillary Lymphadenopathy] : no left axillary lymphadenopathy [No Edema] : no edema [No Rashes] : no rashes [No Ulceration] : no ulceration [de-identified] : Bilateral breasts are surgically absent with flaps well incorporated, incisions well healed.

## 2023-09-21 NOTE — PATIENT PROFILE ADULT - FUNCTIONAL ASSESSMENT - DAILY ACTIVITY 3.
[address] [facility]   Physician Orders and Discharge Instructions  [facility], [address] [place] [address] Avenue  Telephone: [phone]        NAME:  Joss Dickson OF BIRTH:  1943  MEDICAL RECORD NUMBER:  [id number]     Your  is:  Socorro Huang Care/Facility:  [facility] CARE     Wound Location:   LEFT ANTERIOR ANKLE      Dressing orders: 1. Cleanse wound(s) with normal saline. 2.. Apply Cyn to the wound bed  3. Then apply dry dressing, wrap with graciela   4. Change every other day or Monday, Wednesday and Friday      HOME CARE TO [facility] BILATERAL FEET WITH SOAP AND WATER THEN APPLY AQUAPHOR WITH EACH DRESSING CHANGE,     Compression:  NONE     Offloading Device: None     Other Instructions:  Keep all dressings clean, dry and intact. Keep pressure off the wound(s) at all times. Follow up visit       2 WEEKs   October 5, 2023  @ 9:45 am     Please give 24 hour notice if unable to keep appointment. [phone]     If you experience any of the following, please call the Wound Care Service at  [phone] or go to the nearest emergency room. *Increase in pain         *Temperature over 101           *Increase in drainage from your wound or a foul odor  *Uncontrolled swelling            *Need for compression bandage changes due to slippage, breakthrough drainage       PLEASE NOTE: IF YOU ARE UNABLE TO OBTAIN WOUND SUPPLIES, CONTINUE TO USE THE SUPPLIES YOU HAVE AVAILABLE UNTIL YOU ARE ABLE TO REACH US.  IT IS MOST IMPORTANT TO KEEP THE WOUND COVERED AT ALL TIMES 4 = No assist / stand by assistance

## 2023-12-04 ENCOUNTER — APPOINTMENT (OUTPATIENT)
Dept: SURGERY | Facility: CLINIC | Age: 48
End: 2023-12-04
Payer: COMMERCIAL

## 2023-12-04 VITALS
WEIGHT: 140 LBS | DIASTOLIC BLOOD PRESSURE: 84 MMHG | HEART RATE: 50 BPM | BODY MASS INDEX: 23.9 KG/M2 | OXYGEN SATURATION: 99 % | TEMPERATURE: 98.5 F | SYSTOLIC BLOOD PRESSURE: 121 MMHG | HEIGHT: 64 IN

## 2023-12-04 DIAGNOSIS — N64.4 MASTODYNIA: ICD-10-CM

## 2023-12-04 PROCEDURE — 99213 OFFICE O/P EST LOW 20 MIN: CPT

## 2023-12-22 ENCOUNTER — RESULT REVIEW (OUTPATIENT)
Age: 48
End: 2023-12-22

## 2023-12-22 ENCOUNTER — OUTPATIENT (OUTPATIENT)
Dept: OUTPATIENT SERVICES | Facility: HOSPITAL | Age: 48
LOS: 1 days | End: 2023-12-22
Payer: COMMERCIAL

## 2023-12-22 ENCOUNTER — APPOINTMENT (OUTPATIENT)
Dept: ULTRASOUND IMAGING | Facility: CLINIC | Age: 48
End: 2023-12-22
Payer: COMMERCIAL

## 2023-12-22 DIAGNOSIS — N64.4 MASTODYNIA: ICD-10-CM

## 2023-12-22 DIAGNOSIS — Z90.721 ACQUIRED ABSENCE OF OVARIES, UNILATERAL: Chronic | ICD-10-CM

## 2023-12-22 PROCEDURE — 76641 ULTRASOUND BREAST COMPLETE: CPT

## 2023-12-22 PROCEDURE — 76641 ULTRASOUND BREAST COMPLETE: CPT | Mod: 26,RT

## 2024-02-07 ENCOUNTER — OUTPATIENT (OUTPATIENT)
Dept: OUTPATIENT SERVICES | Facility: HOSPITAL | Age: 49
LOS: 1 days | Discharge: HOME | End: 2024-02-07

## 2024-02-07 DIAGNOSIS — Z90.721 ACQUIRED ABSENCE OF OVARIES, UNILATERAL: Chronic | ICD-10-CM

## 2024-02-07 DIAGNOSIS — C50.919 MALIGNANT NEOPLASM OF UNSPECIFIED SITE OF UNSPECIFIED FEMALE BREAST: ICD-10-CM

## 2024-02-12 ENCOUNTER — RESULT REVIEW (OUTPATIENT)
Age: 49
End: 2024-02-12

## 2024-02-12 ENCOUNTER — APPOINTMENT (OUTPATIENT)
Dept: HEMATOLOGY ONCOLOGY | Facility: CLINIC | Age: 49
End: 2024-02-12
Payer: COMMERCIAL

## 2024-02-12 VITALS
WEIGHT: 141.03 LBS | SYSTOLIC BLOOD PRESSURE: 142 MMHG | DIASTOLIC BLOOD PRESSURE: 85 MMHG | HEIGHT: 64 IN | OXYGEN SATURATION: 98 % | BODY MASS INDEX: 24.08 KG/M2 | HEART RATE: 65 BPM

## 2024-02-12 DIAGNOSIS — Z79.810 LONG TERM (CURRENT) USE OF SELECTIVE ESTROGEN RECEPTOR MODULATORS (SERMS): ICD-10-CM

## 2024-02-12 LAB
BASOPHILS # BLD AUTO: 0.1 K/UL — SIGNIFICANT CHANGE UP (ref 0–0.2)
BASOPHILS NFR BLD AUTO: 1.5 % — SIGNIFICANT CHANGE UP (ref 0–2)
EOSINOPHIL # BLD AUTO: 0.1 K/UL — SIGNIFICANT CHANGE UP (ref 0–0.5)
EOSINOPHIL NFR BLD AUTO: 1 % — SIGNIFICANT CHANGE UP (ref 0–6)
HCT VFR BLD CALC: 37.9 % — SIGNIFICANT CHANGE UP (ref 34.5–45)
HGB BLD-MCNC: 12.5 G/DL — SIGNIFICANT CHANGE UP (ref 11.5–15.5)
LYMPHOCYTES # BLD AUTO: 1.8 K/UL — SIGNIFICANT CHANGE UP (ref 1–3.3)
LYMPHOCYTES # BLD AUTO: 28.1 % — SIGNIFICANT CHANGE UP (ref 13–44)
MCHC RBC-ENTMCNC: 30.4 PG — SIGNIFICANT CHANGE UP (ref 27–34)
MCHC RBC-ENTMCNC: 33.1 G/DL — SIGNIFICANT CHANGE UP (ref 32–36)
MCV RBC AUTO: 91.8 FL — SIGNIFICANT CHANGE UP (ref 80–100)
MONOCYTES # BLD AUTO: 0.3 K/UL — SIGNIFICANT CHANGE UP (ref 0–0.9)
MONOCYTES NFR BLD AUTO: 4.1 % — SIGNIFICANT CHANGE UP (ref 2–14)
NEUTROPHILS # BLD AUTO: 4.1 K/UL — SIGNIFICANT CHANGE UP (ref 1.8–7.4)
NEUTROPHILS NFR BLD AUTO: 65.3 % — SIGNIFICANT CHANGE UP (ref 43–77)
PLATELET # BLD AUTO: 185 K/UL — SIGNIFICANT CHANGE UP (ref 150–400)
RBC # BLD: 4.13 M/UL — SIGNIFICANT CHANGE UP (ref 3.8–5.2)
RBC # FLD: 12.5 % — SIGNIFICANT CHANGE UP (ref 10.3–14.5)
WBC # BLD: 6.3 K/UL — SIGNIFICANT CHANGE UP (ref 3.8–10.5)
WBC # FLD AUTO: 6.3 K/UL — SIGNIFICANT CHANGE UP (ref 3.8–10.5)

## 2024-02-12 PROCEDURE — 99214 OFFICE O/P EST MOD 30 MIN: CPT

## 2024-02-12 NOTE — ASSESSMENT
[FreeTextEntry1] : She is a 48-year-old premenopausal female with stage IIA right breast invasive lobular carcinoma, classic type, multifocal (pT2N0) ER 90%, [place]>90%, HER2 negative diagnosed at age 46 (12/2021). S/p bilateral mastectomy with RICH reconstruction on 2/9/22. Final pathology showed multifocal ILC, 2 foci measuring 3 cm and 0.7 cm each, margins negative but closest margin 0.3 mm away (deep), 0/3 lymph nodes.  Oncotype Dx RS 14, chemo benefit <1%, distant risk of recurrence at 9 years with Clay is 4%. She was seen by rad-onc.  Started Tamoxifen 3/31/22  LMP 4/2023, reports spotting x1 episode last week, follows up with GYN Dr Cee No side effects related to Tamoxifen.  12/22/23 Sono right breast- Unremarkable ultrasound exam.  Plan:  -Continue Tamoxifen -Continue f/u with GYN, next in 4/2024 -UTD with colonoscopy -Follow-up in 6 months

## 2024-02-12 NOTE — REASON FOR VISIT
[Follow-Up Visit] : a follow-up [FreeTextEntry2] :  right breast invasive lobular carcinoma, classic type, multifocal (pT2N0) ER 90%, WV>90%, HER2 negativePR>90%, HER2 negativ

## 2024-02-12 NOTE — RESULTS/DATA
[FreeTextEntry1] : EXAM: [id number] - US BREAST COMPLETE RT  - ORDERED BY: ALEXANDRIA CIPRIANO  PROCEDURE DATE:  12/22/2023  INTERPRETATION:  RIGHT BREAST ULTRASOUND LIMITED  CLINICAL INDICATION: 40-year-old woman, history of bilateral mastectomy secondary to right breast cancer diagnosed December 2021. The patient had a mastectomy February 2022, with subsequent implant reconstruction. Now reports right breast pain.  COMPARISON STUDIES: Dated  12/9/2021 (premastectomy).  Sonographic evaluation of the right breast was performed targeted to the patient's area of pain. Images were obtained by the technologist and submitted for interpretation.  FINDINGS: The breast parenchyma demonstrates a heterogeneous background echotexture (mixed fatty and fibroglandular).  At the area of pain, no cysts nor solid nodules are noted. The underlying implant appears sonographically unremarkable. There is no skin thickening.  IMPRESSION: Unremarkable ultrasound exam. Recommend clinical management of the pain.  The patient was informed of the results in person.  RECOMMENDATION:  Clinical follow up.  BI-RADS 1- Negative These results and recommendations were explained to the patient, who will also be mailed a written summary in layman's terms.

## 2024-02-12 NOTE — HISTORY OF PRESENT ILLNESS
[de-identified] : Ms. Harrington was diagnosed with breast cancer at age 46 in December 2021.  She had a screening mammogram on 11/3/21 which showed a questionable new asymmetry  upper outer posterior right breast.  Subsequent diagnostic mammogram/sonogram on 12/3/21 showed: 1.2 x 1.2 x 1.7 cm irregular mass at 10:00 right breast, 6 cmfn. No axillary adenopathy.  On 12/9/21 right breast 10:00, 6 cmfn core biposy - IDC, grade 6/9, 1 cm in length. ER 90%, SC>90%, HER2 negative.   On 12/20/21 MRI breast -Marked background parenchymal enhancement significantly lowers the sensitivity of breast MRI for detection of small enhancing lesions. A 2.1 cm biopsy-proven malignancy in the posterior 10:00 RIGHT breast. Additionally, there is suspicious asymmetric nonmass enhancement involving most of the upper right breast including the site of biopsy-proven malignancy in the upper outer breast and the upper inner quadrant. MR guided biopsy of a representative region in the upper inner quadrant is recommended (76062:100). No lymphadenopathy and no MR evidence of malignancy in the contralateral breast.   S/p bilateral mastectomy with RICH reconstruction on 2/9/22. Final pathology showed multifocal ILC, 2 foci measuring 3 cm and 0.7 cm each, margins negative but closest margin 0.3 mm away (deep), 0/3 lymph nodes.  Oncotype Dx RS 14, chemo benefit <1%, distant risk of recurrence at 9 years with Clay is 4%. She was seen by rad-onc.  Started Tamoxifen 3/31/22  PMHx: Denies  FMHx: Maternal Great Aunt breast Cancer, Father prostate cancer dx early 60's  Sx: Cystectomy   Socx: Social Alcohol Use, Non-smoker  PCP: Dr. Yung Beverly in [place]  S/p routine screening colonoscopy on 3/6/23 with GI Dr Alexandre, notes normal findings  LMS 12/26/2021 GYN Dr. Bass, pap smear - 04/2021  Notes she has 3 children.     [de-identified] : Patient returns for follow-up Notes compliance to Tamoxifen daily  11/23 c/o pain in the right breast, US unremarkable Reports leg crampx1 episode LMP 4/2023. reports spotting x1 episode last week, follows up with GYN Dr Cee Denies vaginal dryness Denies hot flashes Denies mood changes  Denies headache, dizziness Denies dyspnea, cough Denies abdominal pain, nausea, vomiting, pain  Feels well

## 2024-02-12 NOTE — PHYSICAL EXAM
[Normal] : affect appropriate [de-identified] : supple [de-identified] : No edema [de-identified] : No palpable breast mass or axillary lymphadenopathy bilaterally  Improved

## 2024-02-13 LAB
ALBUMIN SERPL ELPH-MCNC: 4.4 G/DL
ALP BLD-CCNC: 54 U/L
ALT SERPL-CCNC: 14 U/L
ANION GAP SERPL CALC-SCNC: 10 MMOL/L
AST SERPL-CCNC: 23 U/L
BILIRUB SERPL-MCNC: 0.3 MG/DL
BUN SERPL-MCNC: 13 MG/DL
CALCIUM SERPL-MCNC: 9.5 MG/DL
CHLORIDE SERPL-SCNC: 104 MMOL/L
CO2 SERPL-SCNC: 27 MMOL/L
CREAT SERPL-MCNC: 0.86 MG/DL
EGFR: 83 ML/MIN/1.73M2
GLUCOSE SERPL-MCNC: 116 MG/DL
POTASSIUM SERPL-SCNC: 4.2 MMOL/L
PROT SERPL-MCNC: 6 G/DL
SODIUM SERPL-SCNC: 141 MMOL/L

## 2024-03-11 ENCOUNTER — APPOINTMENT (OUTPATIENT)
Dept: SURGERY | Facility: CLINIC | Age: 49
End: 2024-03-11
Payer: COMMERCIAL

## 2024-03-11 ENCOUNTER — APPOINTMENT (OUTPATIENT)
Dept: DERMATOLOGY | Facility: CLINIC | Age: 49
End: 2024-03-11
Payer: COMMERCIAL

## 2024-03-11 VITALS
HEIGHT: 64 IN | WEIGHT: 140 LBS | DIASTOLIC BLOOD PRESSURE: 80 MMHG | OXYGEN SATURATION: 97 % | BODY MASS INDEX: 23.9 KG/M2 | SYSTOLIC BLOOD PRESSURE: 124 MMHG | HEART RATE: 57 BPM | TEMPERATURE: 98 F

## 2024-03-11 DIAGNOSIS — Z90.13 ACQUIRED ABSENCE OF BILATERAL BREASTS AND NIPPLES: ICD-10-CM

## 2024-03-11 DIAGNOSIS — C50.911 MALIGNANT NEOPLASM OF UNSPECIFIED SITE OF RIGHT FEMALE BREAST: ICD-10-CM

## 2024-03-11 PROCEDURE — 99213 OFFICE O/P EST LOW 20 MIN: CPT

## 2024-03-11 PROCEDURE — 99203 OFFICE O/P NEW LOW 30 MIN: CPT

## 2024-03-11 NOTE — ASSESSMENT
[FreeTextEntry1] : 49 year old postmenopausal female with a history of stage 2 right breast cancer treated with bilateral mastectomy and right SLNB, currently on tamoxifen and tolerating well.   CBE is benign. Plan for repeat CBE in 6 months. Patient to continue tamoxifen/follow up with medical oncology   Patient verbalized understanding of all treatment plans. All of her questions were answered to the best of my ability. She was encouraged to call the office for any questions or concerns sooner if needed.    Plan 1. Follow up in 6 months  2. Followup med onc

## 2024-03-11 NOTE — HISTORY OF PRESENT ILLNESS
[FreeTextEntry1] : Oncology History: 1. Right breast multifocal pT2N0 IDC grade 2 ER 90% CA >90% Her2 negative.     - Underwent bilateral NSM with right SLNB and RICH reconstruction 22      FINAL PATH: Left breast; nipple sparing: benign findings. Right breast; nipple sparing: Multifocal invasive lobular carcinoma; extensive LCIS (2 foci of invasive carcinoma; largest measuring 3.0 cm). 0/2 right sentinel lymph nodes.      -Oncotype score was 14     -Tamoxifen started 3/31/22     -Declined PMRT      -Revision of bilateral breast recon and abdominal site 22  CARE TEAM Med Onc - [facility] - [facility]   INTERVAL HISTORY: (23): Patient presents for CBE. She had a revisional surgery w/Dr. Duncan in . No plans for further revision at this time. She is on the Tamoxifen and tolerating well. She does not do SBE.   (23): Patient presents for 6 month follow up. On tamoxifen tolerating well. No breast complaints at this time.   (23): Patient presents for follow up. States since end of october has been experiencing pain in the right breast UOQ, does not radiate, no recent injuries or trauma, does not notice any aggravating factors with the pain. Pain comes and goes, not constant. On tamoxifen tolerating well   (24): Patient presents for follow up. Denies breast complaints. States she was having breast pain back in October, but had just started back at the gym and is no longer experiencing that pain. She is on Tamoxifen tolerating well.    HPI:  Patient initially presented to Dr. Packer in 2021 for a new right breast cancer. Screening imaging demonstrated a new questionable asymmetry in the right breast. Call back mammogram recommended for biopsy of the 1.7 cm nodule. Biopsy was performed on 2021 demonstrating IDC grade 2 ER 90% CA >90% Her2 negative.    IMAGIN23: [facility]: Right Breast Ultrasound Limited: Impression - Unremarkable ultrasound exam. Recommend clinical management of pain. BI-RADS 1

## 2024-03-11 NOTE — PHYSICAL EXAM
[Normocephalic] : normocephalic [Atraumatic] : atraumatic [EOMI] : extra ocular movement intact [PERRL] : pupils equal, round and reactive to light [Supple] : supple [Sclera nonicteric] : sclera nonicteric [No Cervical Adenopathy] : no cervical adenopathy [No Supraclavicular Adenopathy] : no supraclavicular adenopathy [Examined in the supine and seated position] : examined in the supine and seated position [None] : no ptosis [No dominant masses] : no dominant masses in right breast  [No dominant masses] : no dominant masses left breast [No Nipple Retraction] : no left nipple retraction [No Nipple Discharge] : no left nipple discharge [No Edema] : no edema [No Axillary Lymphadenopathy] : no left axillary lymphadenopathy [No Rashes] : no rashes [No Ulceration] : no ulceration [de-identified] : Bilateral breasts are surgically absent with flaps well incorporated, incisions well healed.

## 2024-03-12 ENCOUNTER — NON-APPOINTMENT (OUTPATIENT)
Age: 49
End: 2024-03-12

## 2024-04-26 ENCOUNTER — APPOINTMENT (OUTPATIENT)
Dept: OBGYN | Facility: CLINIC | Age: 49
End: 2024-04-26
Payer: COMMERCIAL

## 2024-04-26 VITALS
SYSTOLIC BLOOD PRESSURE: 116 MMHG | HEIGHT: 64 IN | DIASTOLIC BLOOD PRESSURE: 77 MMHG | WEIGHT: 140 LBS | BODY MASS INDEX: 23.9 KG/M2

## 2024-04-26 DIAGNOSIS — Z17.0 ESTROGEN RECEPTOR POSITIVE STATUS [ER+]: ICD-10-CM

## 2024-04-26 DIAGNOSIS — Z00.00 ENCOUNTER FOR GENERAL ADULT MEDICAL EXAMINATION W/OUT ABNORMAL FINDINGS: ICD-10-CM

## 2024-04-26 PROCEDURE — 99396 PREV VISIT EST AGE 40-64: CPT

## 2024-04-26 NOTE — HISTORY OF PRESENT ILLNESS
[FreeTextEntry1] : 48 yo pt here for annual exam.  had charles mastectomies for br ca.  on tamoxifen.  lmp april 23, had brief episode spotting in dec.  denies hot flashes , night sweats.  denies dyspareunia meds- tamox only denies gyn co.  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

## 2024-04-29 ENCOUNTER — OUTPATIENT (OUTPATIENT)
Dept: OUTPATIENT SERVICES | Facility: HOSPITAL | Age: 49
LOS: 1 days | End: 2024-04-29
Payer: COMMERCIAL

## 2024-04-29 ENCOUNTER — APPOINTMENT (OUTPATIENT)
Dept: RADIOLOGY | Facility: CLINIC | Age: 49
End: 2024-04-29
Payer: COMMERCIAL

## 2024-04-29 DIAGNOSIS — Z90.721 ACQUIRED ABSENCE OF OVARIES, UNILATERAL: Chronic | ICD-10-CM

## 2024-04-29 DIAGNOSIS — Z00.00 ENCOUNTER FOR GENERAL ADULT MEDICAL EXAMINATION WITHOUT ABNORMAL FINDINGS: ICD-10-CM

## 2024-04-29 PROCEDURE — 77080 DXA BONE DENSITY AXIAL: CPT

## 2024-04-29 PROCEDURE — 77080 DXA BONE DENSITY AXIAL: CPT | Mod: 26

## 2024-04-30 LAB — HPV HIGH+LOW RISK DNA PNL CVX: NOT DETECTED

## 2024-05-04 LAB — CYTOLOGY CVX/VAG DOC THIN PREP: ABNORMAL

## 2024-06-11 ENCOUNTER — ASOB RESULT (OUTPATIENT)
Age: 49
End: 2024-06-11

## 2024-06-11 ENCOUNTER — APPOINTMENT (OUTPATIENT)
Dept: ANTEPARTUM | Facility: CLINIC | Age: 49
End: 2024-06-11
Payer: COMMERCIAL

## 2024-06-11 ENCOUNTER — APPOINTMENT (OUTPATIENT)
Dept: OBGYN | Facility: CLINIC | Age: 49
End: 2024-06-11
Payer: COMMERCIAL

## 2024-06-11 VITALS
BODY MASS INDEX: 23.9 KG/M2 | HEIGHT: 64 IN | DIASTOLIC BLOOD PRESSURE: 79 MMHG | HEART RATE: 66 BPM | SYSTOLIC BLOOD PRESSURE: 126 MMHG | WEIGHT: 140 LBS

## 2024-06-11 DIAGNOSIS — R93.89 ABNORMAL FINDINGS ON DIAGNOSTIC IMAGING OF OTHER SPECIFIED BODY STRUCTURES: ICD-10-CM

## 2024-06-11 DIAGNOSIS — D21.9 BENIGN NEOPLASM OF CONNECTIVE AND OTHER SOFT TISSUE, UNSPECIFIED: ICD-10-CM

## 2024-06-11 PROCEDURE — 76856 US EXAM PELVIC COMPLETE: CPT | Mod: 59

## 2024-06-11 PROCEDURE — 99214 OFFICE O/P EST MOD 30 MIN: CPT

## 2024-06-11 PROCEDURE — 76830 TRANSVAGINAL US NON-OB: CPT

## 2024-06-11 RX ORDER — MISOPROSTOL 200 UG/1
200 TABLET ORAL
Qty: 2 | Refills: 0 | Status: ACTIVE | COMMUNITY
Start: 2024-06-11 | End: 1900-01-01

## 2024-06-12 ENCOUNTER — TRANSCRIPTION ENCOUNTER (OUTPATIENT)
Age: 49
End: 2024-06-12

## 2024-06-13 NOTE — HISTORY OF PRESENT ILLNESS
[FreeTextEntry1] : pt here to fu us done for spotting on tamoxifen  us shows multiple fibroids, 10.4 mm endo lining, mult simple cysts on left ovary w nl doppler.

## 2024-06-13 NOTE — PLAN
[FreeTextEntry1] : dw pt fu us for multiple cysts remaining ovary thickeened yumiko lining w pmb, pt will rto for endo see/bx. cytotec prescribed.

## 2024-08-05 ENCOUNTER — APPOINTMENT (OUTPATIENT)
Dept: OBGYN | Facility: CLINIC | Age: 49
End: 2024-08-05

## 2024-08-05 PROCEDURE — 99214 OFFICE O/P EST MOD 30 MIN: CPT | Mod: 57,25

## 2024-08-05 PROCEDURE — 58558Z: CUSTOM

## 2024-08-05 NOTE — HISTORY OF PRESENT ILLNESS
[FreeTextEntry1] : This 49-year-old P2 presents for an endometrial biopsy after having an ultrasound that showed a thickened endometrial lining with multiple fibroids after she had some postmenopausal bleeding while she is on tamoxifen.  She also had an ablation in the past.  Her uterus is bulky about 12 weeks size uterus with multiple fibroids of over 4 cm.  The patient had breast cancer and had bilateral mastectomies and a TRAM flap reconstruction.

## 2024-08-05 NOTE — PLAN
[FreeTextEntry1] : I discussed with the patient that being post ablation can make her endometrial lining look thicker and can make it harder to sample the lining of the uterus.  She reports having some pelvic pressure and stretching pain that she attributes to her TRAM flap and reconstruction but is not sure if it might be also associated with her bulky fibroid uterus.  I discussed that it may be impossible to adequately sample her ablated cavity even though we could try it with a D&C.  Given that she has about a 12-week fibroid uterus and some ongoing pelvic discomfort that she may want to consider hysterectomy.  As a patient who had premenopausal breast cancer, she stated that she would prefer to move ahead with a hysterectomy then try simply resampling.  I will call her with the biopsy results, and we discussed the risks and benefits of hysterectomy given the size of her uterus and recommending an open KIRAN.  She has had positive HPV's and mild dysplasia on her cervix in the past suggesting it may be prudent to remove the cervix as well as well as removing the tubes.

## 2024-08-05 NOTE — PROCEDURE
[Hysteroscopy] : Hysteroscopy [Time out performed] : Pre-procedure time out performed.  Patient's name, date of birth and procedure confirmed. [Consent Obtained] : Consent obtained [Risks] : risks [Benefits] : benefits [Alternatives] : alternatives [Patient] : patient [Infection] : infection [Bleeding] : bleeding [Allergic Reaction] : allergic reaction [Pretreatment with misoprostol] : pretreatment with misoprostol [Lidocaine___ mL] : [unfilled] ~UmL of lidocaine [flexible] : Using aseptic technique a hysteroscopy was performed using a flexible hysteroscope [Sent to Pathology] : specimen was placed in buffered formalin and sent for pathology [Antibiotics given] : antibiotics not given [Hemostasis obtained] : hemostasis obtained [Tolerated Well] : Patient tolerated the procedure well [Aftercare instructions/regstrictions given and follow-up scheduled] : Aftercare instructions/restrictions given and follow-up scheduled [de-identified] : heavy endometrial scarring, depth of bx 8 cm.

## 2024-08-07 ENCOUNTER — OUTPATIENT (OUTPATIENT)
Dept: OUTPATIENT SERVICES | Facility: HOSPITAL | Age: 49
LOS: 1 days | Discharge: HOME | End: 2024-08-07

## 2024-08-07 DIAGNOSIS — Z90.721 ACQUIRED ABSENCE OF OVARIES, UNILATERAL: Chronic | ICD-10-CM

## 2024-08-07 DIAGNOSIS — C50.919 MALIGNANT NEOPLASM OF UNSPECIFIED SITE OF UNSPECIFIED FEMALE BREAST: ICD-10-CM

## 2024-08-13 ENCOUNTER — NON-APPOINTMENT (OUTPATIENT)
Age: 49
End: 2024-08-13

## 2024-08-13 ENCOUNTER — APPOINTMENT (OUTPATIENT)
Dept: HEMATOLOGY ONCOLOGY | Facility: CLINIC | Age: 49
End: 2024-08-13
Payer: COMMERCIAL

## 2024-08-13 ENCOUNTER — RESULT REVIEW (OUTPATIENT)
Age: 49
End: 2024-08-13

## 2024-08-13 VITALS
DIASTOLIC BLOOD PRESSURE: 86 MMHG | BODY MASS INDEX: 24.41 KG/M2 | OXYGEN SATURATION: 100 % | HEIGHT: 64 IN | SYSTOLIC BLOOD PRESSURE: 135 MMHG | HEART RATE: 48 BPM | WEIGHT: 143 LBS

## 2024-08-13 DIAGNOSIS — C50.911 MALIGNANT NEOPLASM OF UNSPECIFIED SITE OF RIGHT FEMALE BREAST: ICD-10-CM

## 2024-08-13 DIAGNOSIS — Z79.810 LONG TERM (CURRENT) USE OF SELECTIVE ESTROGEN RECEPTOR MODULATORS (SERMS): ICD-10-CM

## 2024-08-13 LAB
BASOPHILS # BLD AUTO: 0.1 K/UL — SIGNIFICANT CHANGE UP (ref 0–0.2)
BASOPHILS NFR BLD AUTO: 1.3 % — SIGNIFICANT CHANGE UP (ref 0–2)
EOSINOPHIL # BLD AUTO: 0.1 K/UL — SIGNIFICANT CHANGE UP (ref 0–0.5)
EOSINOPHIL NFR BLD AUTO: 2 % — SIGNIFICANT CHANGE UP (ref 0–6)
HCT VFR BLD CALC: 40.2 % — SIGNIFICANT CHANGE UP (ref 34.5–45)
HGB BLD-MCNC: 13.2 G/DL — SIGNIFICANT CHANGE UP (ref 11.5–15.5)
LYMPHOCYTES # BLD AUTO: 1.8 K/UL — SIGNIFICANT CHANGE UP (ref 1–3.3)
LYMPHOCYTES # BLD AUTO: 29.3 % — SIGNIFICANT CHANGE UP (ref 13–44)
MCHC RBC-ENTMCNC: 30.8 PG — SIGNIFICANT CHANGE UP (ref 27–34)
MCHC RBC-ENTMCNC: 32.8 G/DL — SIGNIFICANT CHANGE UP (ref 32–36)
MCV RBC AUTO: 94 FL — SIGNIFICANT CHANGE UP (ref 80–100)
MONOCYTES # BLD AUTO: 0.2 K/UL — SIGNIFICANT CHANGE UP (ref 0–0.9)
MONOCYTES NFR BLD AUTO: 4 % — SIGNIFICANT CHANGE UP (ref 2–14)
NEUTROPHILS # BLD AUTO: 3.8 K/UL — SIGNIFICANT CHANGE UP (ref 1.8–7.4)
NEUTROPHILS NFR BLD AUTO: 63.4 % — SIGNIFICANT CHANGE UP (ref 43–77)
PLATELET # BLD AUTO: 189 K/UL — SIGNIFICANT CHANGE UP (ref 150–400)
RBC # BLD: 4.28 M/UL — SIGNIFICANT CHANGE UP (ref 3.8–5.2)
RBC # FLD: 12.2 % — SIGNIFICANT CHANGE UP (ref 10.3–14.5)
WBC # BLD: 6.1 K/UL — SIGNIFICANT CHANGE UP (ref 3.8–10.5)
WBC # FLD AUTO: 6.1 K/UL — SIGNIFICANT CHANGE UP (ref 3.8–10.5)

## 2024-08-13 PROCEDURE — 99214 OFFICE O/P EST MOD 30 MIN: CPT

## 2024-08-13 PROCEDURE — G2211 COMPLEX E/M VISIT ADD ON: CPT

## 2024-08-13 NOTE — REASON FOR VISIT
[Follow-Up Visit] : a follow-up [Spouse] : spouse [FreeTextEntry2] :  right breast invasive lobular carcinoma, classic type, multifocal (pT2N0) ER 90%, MO>90%, HER2 negativePR>90%, HER2 negativ

## 2024-08-13 NOTE — RESULTS/DATA
[FreeTextEntry1] : EXAM: [id number] - US BREAST COMPLETE RT  - ORDERED BY: ALEXANDRIA CIPRIANO  PROCEDURE DATE:  12/22/2023  INTERPRETATION:  RIGHT BREAST ULTRASOUND LIMITED  CLINICAL INDICATION: 40-year-old woman, history of bilateral mastectomy secondary to right breast cancer diagnosed December 2021. The patient had a mastectomy February 2022, with subsequent implant reconstruction. Now reports right breast pain.  COMPARISON STUDIES: Dated  12/9/2021 (premastectomy).  Sonographic evaluation of the right breast was performed targeted to the patient's area of pain. Images were obtained by the technologist and submitted for interpretation.  FINDINGS: The breast parenchyma demonstrates a heterogeneous background echotexture (mixed fatty and fibroglandular).  At the area of pain, no cysts nor solid nodules are noted. The underlying implant appears sonographically unremarkable. There is no skin thickening.  IMPRESSION: Unremarkable ultrasound exam. Recommend clinical management of the pain.  The patient was informed of the results in person.  RECOMMENDATION:  Clinical follow up.  BI-RADS 1- Negative These results and recommendations were explained to the patient, who will also be mailed a written summary in layman's terms.

## 2024-08-13 NOTE — ADDENDUM
[FreeTextEntry1] : Documented by Fred Galvez acting as scribe for Dr. Rubio on 08/13/2024.   All Medical record entries made by the Scribe were at my, Dr. Rubio's, direction and personally dictated by me on 08/13/2024. I have reviewed the chart and agree that the record accurately reflects my personal performance of the history, physical exam, assessment and plan. I have also personally directed, reviewed, and agreed with the discharge instructions.

## 2024-08-13 NOTE — HISTORY OF PRESENT ILLNESS
[de-identified] : Ms. Harrington was diagnosed with breast cancer at age 46 in December 2021.  She had a screening mammogram on 11/3/21 which showed a questionable new asymmetry  upper outer posterior right breast.  Subsequent diagnostic mammogram/sonogram on 12/3/21 showed: 1.2 x 1.2 x 1.7 cm irregular mass at 10:00 right breast, 6 cmfn. No axillary adenopathy.  On 12/9/21 right breast 10:00, 6 cmfn core biposy - IDC, grade 6/9, 1 cm in length. ER 90%, ID>90%, HER2 negative.   On 12/20/21 MRI breast -Marked background parenchymal enhancement significantly lowers the sensitivity of breast MRI for detection of small enhancing lesions. A 2.1 cm biopsy-proven malignancy in the posterior 10:00 RIGHT breast. Additionally, there is suspicious asymmetric nonmass enhancement involving most of the upper right breast including the site of biopsy-proven malignancy in the upper outer breast and the upper inner quadrant. MR guided biopsy of a representative region in the upper inner quadrant is recommended ([id number]). No lymphadenopathy and no MR evidence of malignancy in the contralateral breast.   S/p bilateral mastectomy with RICH reconstruction on 2/9/22. Final pathology showed multifocal ILC, 2 foci measuring 3 cm and 0.7 cm each, margins negative but closest margin 0.3 mm away (deep), 0/3 lymph nodes.  Oncotype Dx RS 14, chemo benefit <1%, distant risk of recurrence at 9 years with Clay is 4%. She was seen by rad-onc.  Started Tamoxifen 3/31/22  PMHx: Denies  FMHx: Maternal Great Aunt breast Cancer, Father prostate cancer dx early 60's  Sx: Cystectomy   Socx: Social Alcohol Use, Non-smoker  PCP: Dr. Yung Beverly in [place]  S/p routine screening colonoscopy on 3/6/23 with GI Dr Alexandre, notes normal findings  LMP 4/2023. reports spotting x1 episode in early 2/2024 LMS 12/26/2021 GYN Dr. Bass, pap smear - 04/2021  Notes she has 3 children.     [de-identified] : Patient returns for follow-up Notes compliance to Tamoxifen daily  Reported that Dr. Dotson (GYN) completed an in-office US due to development of spotting in 2/2024. US demonstrated multiple fibroids, 10.4 mm endometrial lining, multiple simple cysts on left ovary w nl doppler. Biopsy of endometrium was done.  Hysterectomy was then recommended by GYN. She is waiting for surgical coordinator to schedule surgery. Reports she is UTD w/ colonoscopy, completed 3/2023.

## 2024-08-13 NOTE — ASSESSMENT
[FreeTextEntry1] : Patient is a 48-year-old premenopausal female with stage IIA right breast invasive lobular carcinoma, classic type, multifocal (pT2N0) ER 90%, NJ>90%, HER2 negative diagnosed at age 46 (12/2021). S/p bilateral mastectomy with RICH reconstruction on 2/9/22. Final pathology showed multifocal ILC, 2 foci measuring 3 cm and 0.7 cm each, margins negative but closest margin 0.3 mm away (deep), 0/3 lymph nodes.  Oncotype Dx RS 14, chemo benefit <1%, distant risk of recurrence at 9 years with Clay is 4%. She was seen by rad-onc.  Started Tamoxifen 3/31/22  LMP 4/2023, reports spotting x1 episode in early 2/2024, follows with GYN Dr Cee 12/22/23 Sono right breast- Unremarkable ultrasound exam.  Plan:  -Continue Tamoxifen -Labs today -Thickened endometrium, s/p biopsy - endometrial polyp.  Also has fibroids, pending hysterectomy with GYN -UTD with colonoscopy, last 3/2023 -Follow-up in 6 months

## 2024-08-13 NOTE — PHYSICAL EXAM
[Normal] : affect appropriate [de-identified] : supple [de-identified] : No edema [de-identified] : No palpable breast mass or axillary lymphadenopathy bilaterally

## 2024-08-14 LAB
ALBUMIN SERPL ELPH-MCNC: 4.5 G/DL
ALP BLD-CCNC: 67 U/L
ALT SERPL-CCNC: 15 U/L
ANION GAP SERPL CALC-SCNC: 13 MMOL/L
AST SERPL-CCNC: 24 U/L
BILIRUB SERPL-MCNC: 0.3 MG/DL
BUN SERPL-MCNC: 14 MG/DL
CALCIUM SERPL-MCNC: 9.6 MG/DL
CHLORIDE SERPL-SCNC: 100 MMOL/L
CO2 SERPL-SCNC: 26 MMOL/L
CREAT SERPL-MCNC: 0.82 MG/DL
EGFR: 88 ML/MIN/1.73M2
GLUCOSE SERPL-MCNC: 102 MG/DL
POTASSIUM SERPL-SCNC: 4.5 MMOL/L
PROT SERPL-MCNC: 6.7 G/DL
SODIUM SERPL-SCNC: 140 MMOL/L

## 2024-08-15 ENCOUNTER — TRANSCRIPTION ENCOUNTER (OUTPATIENT)
Age: 49
End: 2024-08-15

## 2024-09-23 ENCOUNTER — APPOINTMENT (OUTPATIENT)
Dept: SURGERY | Facility: CLINIC | Age: 49
End: 2024-09-23
Payer: COMMERCIAL

## 2024-09-23 VITALS
SYSTOLIC BLOOD PRESSURE: 136 MMHG | OXYGEN SATURATION: 99 % | HEART RATE: 51 BPM | DIASTOLIC BLOOD PRESSURE: 87 MMHG | WEIGHT: 141.2 LBS | HEIGHT: 64 IN | BODY MASS INDEX: 24.11 KG/M2

## 2024-09-23 DIAGNOSIS — Z90.13 ACQUIRED ABSENCE OF BILATERAL BREASTS AND NIPPLES: ICD-10-CM

## 2024-09-23 DIAGNOSIS — C50.911 MALIGNANT NEOPLASM OF UNSPECIFIED SITE OF RIGHT FEMALE BREAST: ICD-10-CM

## 2024-09-23 PROCEDURE — 99213 OFFICE O/P EST LOW 20 MIN: CPT

## 2024-09-23 NOTE — HISTORY OF PRESENT ILLNESS
[FreeTextEntry1] : Oncology History: 1. Right breast multifocal pT2N0 IDC grade 2 ER 90% MD >90% Her2 negative.     - Underwent bilateral NSM with right SLNB and RICH reconstruction 22      FINAL PATH: Left breast; nipple sparing: benign findings. Right breast; nipple sparing: Multifocal invasive lobular carcinoma; extensive LCIS (2 foci of invasive carcinoma; largest measuring 3.0 cm). 0/2 right sentinel lymph nodes.      -Oncotype score was 14     -Tamoxifen started 3/31/22     -Declined PMRT      -Revision of bilateral breast recon and abdominal site 22  CARE TEAM Med Onc - [facility] - [facility] - [facility]   INTERVAL HISTORY: (23): Patient presents for CBE. She had a revisional surgery w/Dr. Duncan in . No plans for further revision at this time. She is on the Tamoxifen and tolerating well. She does not do SBE.   (23): Patient presents for 6 month follow up. On tamoxifen tolerating well. No breast complaints at this time.   (23): Patient presents for follow up. States since end of october has been experiencing pain in the right breast UOQ, does not radiate, no recent injuries or trauma, does not notice any aggravating factors with the pain. Pain comes and goes, not constant. On tamoxifen tolerating well   (24): Patient presents for follow up. Denies breast complaints. States she was having breast pain back in October, but had just started back at the gym and is no longer experiencing that pain. She is on Tamoxifen tolerating well.   (24): Patient presents for follow up. denies breast complaints. having hysterectomy in october - preventative surgery has fibroids and they appear thickened on imaging, states is not expeirencing any symptoms.   HPI:  Patient initially presented to Dr. Packer in 2021 for a new right breast cancer. Screening imaging demonstrated a new questionable asymmetry in the right breast. Call back mammogram recommended for biopsy of the 1.7 cm nodule. Biopsy was performed on 2021 demonstrating IDC grade 2 ER 90% MD >90% Her2 negative.    IMAGIN23: [facility] GSB: Right Breast Ultrasound Limited: Impression - Unremarkable ultrasound exam. Recommend clinical management of pain. BI-RADS 1

## 2024-09-23 NOTE — PHYSICAL EXAM
[Normocephalic] : normocephalic [Atraumatic] : atraumatic [EOMI] : extra ocular movement intact [PERRL] : pupils equal, round and reactive to light [Sclera nonicteric] : sclera nonicteric [Conjunctiva pink] : conjunctiva pink [Supple] : supple [No Supraclavicular Adenopathy] : no supraclavicular adenopathy [No Cervical Adenopathy] : no cervical adenopathy [Examined in the supine and seated position] : examined in the supine and seated position [None] : no ptosis [No dominant masses] : no dominant masses in right breast  [No dominant masses] : no dominant masses left breast [No Nipple Retraction] : no left nipple retraction [No Nipple Discharge] : no left nipple discharge [No Axillary Lymphadenopathy] : no left axillary lymphadenopathy [No Edema] : no edema [No Rashes] : no rashes [No Ulceration] : no ulceration [de-identified] : Bilateral breasts are surgically absent with flaps well incorporated, incisions well healed.

## 2024-10-04 ENCOUNTER — OUTPATIENT (OUTPATIENT)
Dept: OUTPATIENT SERVICES | Facility: HOSPITAL | Age: 49
LOS: 1 days | End: 2024-10-04
Payer: COMMERCIAL

## 2024-10-04 VITALS
DIASTOLIC BLOOD PRESSURE: 60 MMHG | HEART RATE: 52 BPM | HEIGHT: 64 IN | SYSTOLIC BLOOD PRESSURE: 100 MMHG | TEMPERATURE: 98 F | OXYGEN SATURATION: 97 % | RESPIRATION RATE: 20 BRPM | WEIGHT: 142.2 LBS

## 2024-10-04 DIAGNOSIS — Z29.9 ENCOUNTER FOR PROPHYLACTIC MEASURES, UNSPECIFIED: ICD-10-CM

## 2024-10-04 DIAGNOSIS — Z90.721 ACQUIRED ABSENCE OF OVARIES, UNILATERAL: Chronic | ICD-10-CM

## 2024-10-04 DIAGNOSIS — Z01.818 ENCOUNTER FOR OTHER PREPROCEDURAL EXAMINATION: ICD-10-CM

## 2024-10-04 DIAGNOSIS — Z90.13 ACQUIRED ABSENCE OF BILATERAL BREASTS AND NIPPLES: Chronic | ICD-10-CM

## 2024-10-04 DIAGNOSIS — Z90.13 ACQUIRED ABSENCE OF BILATERAL BREASTS AND NIPPLES: ICD-10-CM

## 2024-10-04 DIAGNOSIS — Z98.890 OTHER SPECIFIED POSTPROCEDURAL STATES: Chronic | ICD-10-CM

## 2024-10-04 DIAGNOSIS — Z30.2 ENCOUNTER FOR STERILIZATION: ICD-10-CM

## 2024-10-04 LAB
A1C WITH ESTIMATED AVERAGE GLUCOSE RESULT: 5.5 % — SIGNIFICANT CHANGE UP (ref 4–5.6)
ALBUMIN SERPL ELPH-MCNC: 4.1 G/DL — SIGNIFICANT CHANGE UP (ref 3.3–5.2)
ALP SERPL-CCNC: 47 U/L — SIGNIFICANT CHANGE UP (ref 40–120)
ALT FLD-CCNC: 12 U/L — SIGNIFICANT CHANGE UP
ANION GAP SERPL CALC-SCNC: 5 MMOL/L — SIGNIFICANT CHANGE UP (ref 5–17)
APPEARANCE UR: CLEAR — SIGNIFICANT CHANGE UP
APTT BLD: 22.7 SEC — LOW (ref 24.5–35.6)
AST SERPL-CCNC: 21 U/L — SIGNIFICANT CHANGE UP
BASOPHILS # BLD AUTO: 0.04 K/UL — SIGNIFICANT CHANGE UP (ref 0–0.2)
BASOPHILS NFR BLD AUTO: 0.8 % — SIGNIFICANT CHANGE UP (ref 0–2)
BILIRUB SERPL-MCNC: 0.5 MG/DL — SIGNIFICANT CHANGE UP (ref 0.4–2)
BILIRUB UR-MCNC: NEGATIVE — SIGNIFICANT CHANGE UP
BLD GP AB SCN SERPL QL: SIGNIFICANT CHANGE UP
BUN SERPL-MCNC: 13.8 MG/DL — SIGNIFICANT CHANGE UP (ref 8–20)
CALCIUM SERPL-MCNC: 9.6 MG/DL — SIGNIFICANT CHANGE UP (ref 8.4–10.5)
CHLORIDE SERPL-SCNC: 103 MMOL/L — SIGNIFICANT CHANGE UP (ref 96–108)
CO2 SERPL-SCNC: 29 MMOL/L — SIGNIFICANT CHANGE UP (ref 22–29)
COLOR SPEC: YELLOW — SIGNIFICANT CHANGE UP
CREAT SERPL-MCNC: 0.72 MG/DL — SIGNIFICANT CHANGE UP (ref 0.5–1.3)
DIFF PNL FLD: NEGATIVE — SIGNIFICANT CHANGE UP
EGFR: 102 ML/MIN/1.73M2 — SIGNIFICANT CHANGE UP
EOSINOPHIL # BLD AUTO: 0.07 K/UL — SIGNIFICANT CHANGE UP (ref 0–0.5)
EOSINOPHIL NFR BLD AUTO: 1.3 % — SIGNIFICANT CHANGE UP (ref 0–6)
ESTIMATED AVERAGE GLUCOSE: 111 MG/DL — SIGNIFICANT CHANGE UP (ref 68–114)
GLUCOSE SERPL-MCNC: 91 MG/DL — SIGNIFICANT CHANGE UP (ref 70–99)
GLUCOSE UR QL: NEGATIVE MG/DL — SIGNIFICANT CHANGE UP
HCG UR QL: NEGATIVE — SIGNIFICANT CHANGE UP
HCT VFR BLD CALC: 36.8 % — SIGNIFICANT CHANGE UP (ref 34.5–45)
HGB BLD-MCNC: 12.2 G/DL — SIGNIFICANT CHANGE UP (ref 11.5–15.5)
IMM GRANULOCYTES NFR BLD AUTO: 0.2 % — SIGNIFICANT CHANGE UP (ref 0–0.9)
INR BLD: 0.99 RATIO — SIGNIFICANT CHANGE UP (ref 0.85–1.16)
KETONES UR-MCNC: NEGATIVE MG/DL — SIGNIFICANT CHANGE UP
LEUKOCYTE ESTERASE UR-ACNC: NEGATIVE — SIGNIFICANT CHANGE UP
LYMPHOCYTES # BLD AUTO: 1.85 K/UL — SIGNIFICANT CHANGE UP (ref 1–3.3)
LYMPHOCYTES # BLD AUTO: 35.2 % — SIGNIFICANT CHANGE UP (ref 13–44)
MCHC RBC-ENTMCNC: 30 PG — SIGNIFICANT CHANGE UP (ref 27–34)
MCHC RBC-ENTMCNC: 33.2 GM/DL — SIGNIFICANT CHANGE UP (ref 32–36)
MCV RBC AUTO: 90.6 FL — SIGNIFICANT CHANGE UP (ref 80–100)
MONOCYTES # BLD AUTO: 0.31 K/UL — SIGNIFICANT CHANGE UP (ref 0–0.9)
MONOCYTES NFR BLD AUTO: 5.9 % — SIGNIFICANT CHANGE UP (ref 2–14)
NEUTROPHILS # BLD AUTO: 2.97 K/UL — SIGNIFICANT CHANGE UP (ref 1.8–7.4)
NEUTROPHILS NFR BLD AUTO: 56.6 % — SIGNIFICANT CHANGE UP (ref 43–77)
NITRITE UR-MCNC: NEGATIVE — SIGNIFICANT CHANGE UP
PH UR: 6.5 — SIGNIFICANT CHANGE UP (ref 5–8)
PLATELET # BLD AUTO: 208 K/UL — SIGNIFICANT CHANGE UP (ref 150–400)
POTASSIUM SERPL-MCNC: 4.7 MMOL/L — SIGNIFICANT CHANGE UP (ref 3.5–5.3)
POTASSIUM SERPL-SCNC: 4.7 MMOL/L — SIGNIFICANT CHANGE UP (ref 3.5–5.3)
PROT SERPL-MCNC: 6.4 G/DL — LOW (ref 6.6–8.7)
PROT UR-MCNC: NEGATIVE MG/DL — SIGNIFICANT CHANGE UP
PROTHROM AB SERPL-ACNC: 11.2 SEC — SIGNIFICANT CHANGE UP (ref 9.9–13.4)
RBC # BLD: 4.06 M/UL — SIGNIFICANT CHANGE UP (ref 3.8–5.2)
RBC # FLD: 13.1 % — SIGNIFICANT CHANGE UP (ref 10.3–14.5)
SODIUM SERPL-SCNC: 137 MMOL/L — SIGNIFICANT CHANGE UP (ref 135–145)
SP GR SPEC: <1.005 — LOW (ref 1–1.03)
T3 SERPL-MCNC: 116 NG/DL — SIGNIFICANT CHANGE UP (ref 80–200)
T4 AB SER-ACNC: 8.4 UG/DL — SIGNIFICANT CHANGE UP (ref 4.5–12)
TSH SERPL-MCNC: 2.06 UIU/ML — SIGNIFICANT CHANGE UP (ref 0.27–4.2)
UROBILINOGEN FLD QL: 0.2 MG/DL — SIGNIFICANT CHANGE UP (ref 0.2–1)
WBC # BLD: 5.25 K/UL — SIGNIFICANT CHANGE UP (ref 3.8–10.5)
WBC # FLD AUTO: 5.25 K/UL — SIGNIFICANT CHANGE UP (ref 3.8–10.5)

## 2024-10-04 PROCEDURE — 93005 ELECTROCARDIOGRAM TRACING: CPT

## 2024-10-04 PROCEDURE — 80053 COMPREHEN METABOLIC PANEL: CPT

## 2024-10-04 PROCEDURE — 81025 URINE PREGNANCY TEST: CPT

## 2024-10-04 PROCEDURE — 86850 RBC ANTIBODY SCREEN: CPT

## 2024-10-04 PROCEDURE — 84436 ASSAY OF TOTAL THYROXINE: CPT

## 2024-10-04 PROCEDURE — 87086 URINE CULTURE/COLONY COUNT: CPT

## 2024-10-04 PROCEDURE — 86901 BLOOD TYPING SEROLOGIC RH(D): CPT

## 2024-10-04 PROCEDURE — 85610 PROTHROMBIN TIME: CPT

## 2024-10-04 PROCEDURE — 85025 COMPLETE CBC W/AUTO DIFF WBC: CPT

## 2024-10-04 PROCEDURE — 36415 COLL VENOUS BLD VENIPUNCTURE: CPT

## 2024-10-04 PROCEDURE — G0463: CPT

## 2024-10-04 PROCEDURE — 85730 THROMBOPLASTIN TIME PARTIAL: CPT

## 2024-10-04 PROCEDURE — 93010 ELECTROCARDIOGRAM REPORT: CPT

## 2024-10-04 PROCEDURE — 86900 BLOOD TYPING SEROLOGIC ABO: CPT

## 2024-10-04 PROCEDURE — 84480 ASSAY TRIIODOTHYRONINE (T3): CPT

## 2024-10-04 PROCEDURE — 81003 URINALYSIS AUTO W/O SCOPE: CPT

## 2024-10-04 PROCEDURE — 84443 ASSAY THYROID STIM HORMONE: CPT

## 2024-10-04 PROCEDURE — 83036 HEMOGLOBIN GLYCOSYLATED A1C: CPT

## 2024-10-04 NOTE — H&P PST ADULT - PROBLEM SELECTOR PLAN 2
SURGICAL TEAM TO ADDRESS    Total Score [ 6    ]    Caprini Score 3-6: Moderate Risk , pharmacologic VTE prophylaxis is indicated for most patients (in the absence of contraindications)

## 2024-10-04 NOTE — H&P PST ADULT - CARDIOVASCULAR
negative HR 50- Runs/cardio daily/regular rate and rhythm/S1 S2 present/no gallops/no rub/no murmur/bradycardia

## 2024-10-04 NOTE — H&P PST ADULT - NSICDXPASTSURGICALHX_GEN_ALL_CORE_FT
PAST SURGICAL HISTORY:  H/O bilateral mastectomy     History of endometrial ablation     S/P oophorectomy Right and Right Fallopian tube removal

## 2024-10-04 NOTE — H&P PST ADULT - ASSESSMENT
50 y/o  F seen in PST 10/4   in anticipation of scheduled  TOTAL ABDOMINAL HYSTERECTOMY B/L SALPINGECTOMIES W/WO REMOVAL TUBE OVARY on  10/24/24 at [facility] w/DR SUN WHEATLEY.  LMP 2023,  reports recent ultrasound showing endometrial lining with multiple fibroids after she had post menopausal bleeding while on Tamoxifen (s/p b/l mastectomy w reconstruction ). She reports in  had uterine ablation.  Denies any gyn/uro symptoms at time of visit.  PENDING MEDICAL CLEARANCE PCP MELISSA PABON [phone] (scheduled for 10/23 appt)      ULTRASOUND IN HIE 24  Transabdominal and Transvaginal sonograms were   performed due to history of breast cancer with   tamoxifen treatment and postmenopausal bleeding,   and revealed:     Enlarged, myomatous uterus with largest myomas as   described above.   Endometrial lining measures 10.38mm.   Right ovary surgically absent.   Left ovarian simple cysts as described above.   No free fluid noted.    OPIOID RISK TOOL    MONSERRAT EACH BOX THAT APPLIES AND ADD TOTALS AT THE END    FAMILY HISTORY OF SUBSTANCE ABUSE                 FEMALE         MALE                                               Alcohol                             [  ]1 pt          [  ]3pts                                               Illegal Durgs                     [  ]2 pts        [  ]3pts                                               Rx Drugs                           [  ]4 pts        [  ]4 pts    PERSONAL HISTORY OF SUBSTANCE ABUSE                                                                                         Alcohol                             [  ]3 pts       [  ]3 pts                                               Illegal Durgs                     [  ]4 pts        [  ]4 pts                                               Rx Drugs                           [  ]5 pts        [  ]5 pts    AGE BETWEEN 16-45 YEARS                                      [  ]1 pt         [  ]1 pt    HISTORY OF PREADOLESCENT  SEXUAL ABUSE                                                             [  ]3 pts        [  ]0pts    PSYCHOLOGICAL DISEASE                     ADD, OCD, Bipolar, Schizophrenia        [  ]2 pts         [  ]2 pts                      Depression                                               [  ]1 pt           [  ]1 pt          Total:  0  A score of 3 or lower indicated LOW risk for future opiod abuse  A score of 4 to 7 indicated moderate risk for future opiod abuse  A score of 8 or higher indicates a high risk for opiod abuse       CAPRINI SCORE    AGE RELATED RISK FACTORS                                                             [ X] Age 41-60 years                                            (1 Point)  [ ] Age: 61-74 years                                           (2 Points)                 [ ] Age= 75 years                                                (3 Points)             DISEASE RELATED RISK FACTORS                                                       [ ] Edema in the lower extremities                 (1 Point)                     [ ] Varicose veins                                               (1 Point)                                 [ ] BMI > 25 Kg/m2                                            (1 Point)                                  [ ] Serious infection (ie PNA)                            (1 Point)                     [ ] Lung disease ( COPD, Emphysema)            (1 Point)                                                                          [ ] Acute myocardial infarction                         (1 Point)                  [ ] Congestive heart failure (in the previous month)  (1 Point)         [ ] Inflammatory bowel disease                            (1 Point)                  [ ] Central venous access, PICC or Port               (2 points)       (within the last month)                                                                [ ] Stroke (in the previous month)                        (5 Points)    [X ] Previous or present malignancy                       (2 points)                                                                                                                                                         HEMATOLOGY RELATED FACTORS                                                         [ ] Prior episodes of VTE                                     (3 Points)                     [ ] Positive family history for VTE                      (3 Points)                  [ ] Prothrombin 13597 A                                     (3 Points)                     [ ] Factor V Leiden                                                (3 Points)                        [ ] Lupus anticoagulants                                      (3 Points)                                                           [ ] Anticardiolipin antibodies                              (3 Points)                                                       [ ] High homocysteine in the blood                   (3 Points)                                             [ ] Other congenital or acquired thrombophilia      (3 Points)                                                [ ] Heparin induced thrombocytopenia                  (3 Points)                                        MOBILITY RELATED FACTORS  [ ] Bed rest                                                         (1 Point)  [ ] Plaster cast                                                    (2 points)  [ ] Bed bound for more than 72 hours           (2 Points)    GENDER SPECIFIC FACTORS  [ ] Pregnancy or had a baby within the last month   (1 Point)  [ ] Post-partum < 6 weeks                                   (1 Point)  [ ] Hormonal therapy  or oral contraception   (1 Point)  [ ] History of pregnancy complications              (1 point)  [ ] Unexplained or recurrent              (1 Point)    OTHER RISK FACTORS                                           (1 Point)  [X ] BMI >40, smoking, diabetes requiring insulin, chemotherapy  blood transfusions and length of surgery over 2 hours    SURGERY RELATED RISK FACTORS  [ ]  Section within the last month     (1 Point)  [ ] Minor surgery                                                  (1 Point)  [ ] Arthroscopic surgery                                       (2 Points)  [ X] Planned major surgery lasting more            (2 Points)      than 45 minutes     [ ] Elective hip or knee joint replacement       (5 points)       surgery                                                TRAUMA RELATED RISK FACTORS  [ ] Fracture of the hip, pelvis, or leg                       (5 Points)  [ ] Spinal cord injury resulting in paralysis             (5 points)       (in the previous month)    [ ] Paralysis  (less than 1 month)                             (5 Points)  [ ] Multiple Trauma within 1 month                        (5 Points)    Total Score [ 6    ]    Caprini Score 0-2: Low Risk, NO VTE prophylaxis required for most patients, encourage ambulation  Caprini Score 3-6: Moderate Risk , pharmacologic VTE prophylaxis is indicated for most patients (in the absence of contraindications)  Caprini Score Greater than or =7: High risk, pharmocologic VTE prophylaxis indicated for most patients (in the absence of contraindications)

## 2024-10-04 NOTE — H&P PST ADULT - PROBLEM SELECTOR PLAN 3
Managed by ONC MD Rubio.   2022 Mastectomy due to Breast CA.  Currently on Tamoxifen.  Instructed to speak with prescriber regarding dosing prior to surgery  PENDING MEDICAL CLEARANCE PCP MELISSA PABON [phone] (scheduled for 10/23 appt)

## 2024-10-04 NOTE — H&P PST ADULT - HISTORY OF PRESENT ILLNESS
This 49-year-old P2 presents for an endometrial biopsy after having an ultrasound that showed a thickened   endometrial lining with multiple fibroids after she had some postmenopausal bleeding while she is on tamoxifen. She   also had an ablation in the past. Her uterus is bulky about 12 weeks size uterus with multiple fibroids of over 4 cm.   The patient had breast cancer and had bilateral mastectomies and a TRAM flap reconstruction   48 y/o  F seen in PST 10/4   in anticipation of scheduled  TOTAL ABDOMINAL HYSTERECTOMY B/L SALPINGECTOMIES W/WO REMOVAL TUBE OVARY on  10/24/24 at [facility] w/DR SUN WHEATLEY.  LMP 2023,  reports recent ultrasound showing endometrial lining with multiple fibroids after she had post menopausal bleeding while on Tamoxifen (s/p b/l mastectomy w reconstruction ). She reports in  had uterine ablation.  Denies any gyn/uro symptoms at time of visit.  PENDING MEDICAL CLEARANCE PCP MELISSA PABON [phone] (scheduled for 10/23 appt)      ULTRASOUND IN HIE 24  Transabdominal and Transvaginal sonograms were   performed due to history of breast cancer with   tamoxifen treatment and postmenopausal bleeding,   and revealed:     Enlarged, myomatous uterus with largest myomas as   described above.   Endometrial lining measures 10.38mm.   Right ovary surgically absent.   Left ovarian simple cysts as described above.   No free fluid noted.

## 2024-10-04 NOTE — H&P PST ADULT - PROBLEM SELECTOR PLAN 1
PST 10/4   in anticipation of scheduled  TOTAL ABDOMINAL HYSTERECTOMY B/L SALPINGECTOMIES W/WO REMOVAL TUBE OVARY on  10/24/24 at [facility] w/DR SUN WHEATLEY. Patient educated on no shaving x 3 days prior to procedure, correct use of surgical scrub, NPO after MN, ERP fluid protocol, preadmission instructions, day of procedure medications, MEDICAL clearance needed.  Pt instructed to stop vitamins/supplements/herbal medications/ASA/NSAIDS for one week prior to surgery and discuss with PMD/PRESCIRBER/SPECIALIST any outstanding items or questions about prescriptions/medications. Written and oral instructions given to patient.   PENDING MEDICAL CLEARANCE PCP MELISSA PABON [phone] (scheduled for 10/23 appt)  LABS IN PST: CBC, CMP, A1C, T&S, THYROID, UCG, UA, C&S, PT/INR EKG

## 2024-10-04 NOTE — H&P PST ADULT - ATTENDING COMMENTS
Airway patent, nasal mucosa clear, mouth with normal mucosa. Throat has no vesicles, no oropharyngeal exudates and uvula is midline.
pt w fibroids, pelvic pain, hpv for hysterectomy with bilateral salpingectromies.   rba dwpt  incl risk bleeding, infection, damage to organs.

## 2024-10-05 LAB
CULTURE RESULTS: SIGNIFICANT CHANGE UP
SPECIMEN SOURCE: SIGNIFICANT CHANGE UP

## 2024-10-11 PROBLEM — Z30.2 ENCOUNTER FOR STERILIZATION: Chronic | Status: ACTIVE | Noted: 2024-10-04

## 2024-10-21 ENCOUNTER — TRANSCRIPTION ENCOUNTER (OUTPATIENT)
Age: 49
End: 2024-10-21

## 2024-10-21 ENCOUNTER — NON-APPOINTMENT (OUTPATIENT)
Age: 49
End: 2024-10-21

## 2024-10-24 ENCOUNTER — APPOINTMENT (OUTPATIENT)
Dept: OBGYN | Facility: HOSPITAL | Age: 49
End: 2024-10-24
Payer: COMMERCIAL

## 2024-10-24 ENCOUNTER — RESULT REVIEW (OUTPATIENT)
Age: 49
End: 2024-10-24

## 2024-10-24 ENCOUNTER — TRANSCRIPTION ENCOUNTER (OUTPATIENT)
Age: 49
End: 2024-10-24

## 2024-10-24 ENCOUNTER — INPATIENT (INPATIENT)
Facility: HOSPITAL | Age: 49
LOS: 0 days | Discharge: HOME | DRG: 761 | End: 2024-10-25
Payer: COMMERCIAL

## 2024-10-24 VITALS
WEIGHT: 141.98 LBS | HEIGHT: 64 IN | OXYGEN SATURATION: 100 % | HEART RATE: 53 BPM | SYSTOLIC BLOOD PRESSURE: 149 MMHG | RESPIRATION RATE: 16 BRPM | DIASTOLIC BLOOD PRESSURE: 91 MMHG | TEMPERATURE: 98 F

## 2024-10-24 DIAGNOSIS — Z30.2 ENCOUNTER FOR STERILIZATION: ICD-10-CM

## 2024-10-24 DIAGNOSIS — Z90.13 ACQUIRED ABSENCE OF BILATERAL BREASTS AND NIPPLES: Chronic | ICD-10-CM

## 2024-10-24 DIAGNOSIS — Z90.721 ACQUIRED ABSENCE OF OVARIES, UNILATERAL: Chronic | ICD-10-CM

## 2024-10-24 DIAGNOSIS — Z98.890 OTHER SPECIFIED POSTPROCEDURAL STATES: Chronic | ICD-10-CM

## 2024-10-24 LAB — BLD GP AB SCN SERPL QL: SIGNIFICANT CHANGE UP

## 2024-10-24 PROCEDURE — 58150 TOTAL HYSTERECTOMY: CPT

## 2024-10-24 PROCEDURE — 88307 TISSUE EXAM BY PATHOLOGIST: CPT | Mod: 26

## 2024-10-24 DEVICE — INTERCEED 3 X 4"
Type: IMPLANTABLE DEVICE | Site: LEFT | Status: NON-FUNCTIONAL
Removed: 2024-10-24

## 2024-10-24 DEVICE — SURGIFLO MATRIX WITH THROMBIN KIT
Type: IMPLANTABLE DEVICE | Site: LEFT | Status: NON-FUNCTIONAL
Removed: 2024-10-24

## 2024-10-24 RX ORDER — CEFAZOLIN SODIUM 1 G
2000 VIAL (EA) INJECTION ONCE
Refills: 0 | Status: DISCONTINUED | OUTPATIENT
Start: 2024-10-24 | End: 2024-10-24

## 2024-10-24 RX ORDER — ANTACID TABLETS 500 MG/1
2 TABLET, CHEWABLE ORAL
Refills: 0 | DISCHARGE

## 2024-10-24 RX ORDER — OXYCODONE HYDROCHLORIDE 30 MG/1
5 TABLET ORAL
Refills: 0 | Status: DISCONTINUED | OUTPATIENT
Start: 2024-10-24 | End: 2024-10-25

## 2024-10-24 RX ORDER — FENTANYL CITRAT/DEXTROSE 5%/PF 1250MCG/50
50 PATIENT CONTROLLED ANALGESIA SYRINGE INTRAVENOUS
Refills: 0 | Status: DISCONTINUED | OUTPATIENT
Start: 2024-10-24 | End: 2024-10-24

## 2024-10-24 RX ORDER — POLYETHYLENE GLYCOL 3350 17 G/17G
17 POWDER, FOR SOLUTION ORAL AT BEDTIME
Refills: 0 | Status: DISCONTINUED | OUTPATIENT
Start: 2024-10-24 | End: 2024-10-25

## 2024-10-24 RX ORDER — ONDANSETRON HYDROCHLORIDE 2 MG/ML
4 INJECTION, SOLUTION INTRAMUSCULAR; INTRAVENOUS ONCE
Refills: 0 | Status: DISCONTINUED | OUTPATIENT
Start: 2024-10-24 | End: 2024-10-24

## 2024-10-24 RX ORDER — SIMETHICONE 80 MG/1
80 TABLET, CHEWABLE ORAL EVERY 6 HOURS
Refills: 0 | Status: DISCONTINUED | OUTPATIENT
Start: 2024-10-24 | End: 2024-10-25

## 2024-10-24 RX ORDER — IBUPROFEN 200 MG
600 TABLET ORAL EVERY 6 HOURS
Refills: 0 | Status: DISCONTINUED | OUTPATIENT
Start: 2024-10-24 | End: 2024-10-25

## 2024-10-24 RX ORDER — CALCIUM CARBONATE/VITAMIN D3 600MG-5MCG
1 TABLET ORAL
Refills: 0 | DISCHARGE

## 2024-10-24 RX ORDER — HYDROMORPHONE HCL/0.9% NACL/PF 6 MG/30 ML
0.5 PATIENT CONTROLLED ANALGESIA SYRINGE INTRAVENOUS
Refills: 0 | Status: DISCONTINUED | OUTPATIENT
Start: 2024-10-24 | End: 2024-10-24

## 2024-10-24 RX ORDER — KETOROLAC TROMETHAMINE 30 MG/ML
15 INJECTION INTRAMUSCULAR; INTRAVENOUS EVERY 8 HOURS
Refills: 0 | Status: COMPLETED | OUTPATIENT
Start: 2024-10-24 | End: 2024-10-25

## 2024-10-24 RX ORDER — SODIUM CHLORIDE 9 MG/ML
3 INJECTION, SOLUTION INTRAMUSCULAR; INTRAVENOUS; SUBCUTANEOUS EVERY 8 HOURS
Refills: 0 | Status: DISCONTINUED | OUTPATIENT
Start: 2024-10-24 | End: 2024-10-24

## 2024-10-24 RX ORDER — ACETAMINOPHEN 500 MG
1000 TABLET ORAL EVERY 6 HOURS
Refills: 0 | Status: DISCONTINUED | OUTPATIENT
Start: 2024-10-24 | End: 2024-10-25

## 2024-10-24 RX ORDER — TAMOXIFEN CITRATE 20 MG/1
20 TABLET, FILM COATED ORAL DAILY
Refills: 0 | Status: DISCONTINUED | OUTPATIENT
Start: 2024-10-25 | End: 2024-10-25

## 2024-10-24 RX ORDER — ENOXAPARIN SODIUM 40MG/0.4ML
40 SYRINGE (ML) SUBCUTANEOUS EVERY 24 HOURS
Refills: 0 | Status: DISCONTINUED | OUTPATIENT
Start: 2024-10-25 | End: 2024-10-25

## 2024-10-24 RX ORDER — ONDANSETRON HYDROCHLORIDE 2 MG/ML
8 INJECTION, SOLUTION INTRAMUSCULAR; INTRAVENOUS EVERY 8 HOURS
Refills: 0 | Status: DISCONTINUED | OUTPATIENT
Start: 2024-10-24 | End: 2024-10-25

## 2024-10-24 RX ORDER — TAMOXIFEN CITRATE 10 MG
1 TABLET ORAL
Refills: 0 | DISCHARGE

## 2024-10-24 RX ORDER — METRONIDAZOLE 250 MG/1
500 TABLET ORAL ONCE
Refills: 0 | Status: COMPLETED | OUTPATIENT
Start: 2024-10-24 | End: 2024-10-24

## 2024-10-24 RX ORDER — OXYCODONE HYDROCHLORIDE 30 MG/1
10 TABLET ORAL EVERY 4 HOURS
Refills: 0 | Status: DISCONTINUED | OUTPATIENT
Start: 2024-10-24 | End: 2024-10-25

## 2024-10-24 RX ORDER — ACETAMINOPHEN 500 MG
975 TABLET ORAL EVERY 6 HOURS
Refills: 0 | Status: DISCONTINUED | OUTPATIENT
Start: 2024-10-24 | End: 2024-10-25

## 2024-10-24 RX ADMIN — Medication 50 MICROGRAM(S): at 20:47

## 2024-10-24 RX ADMIN — Medication 0.5 MILLIGRAM(S): at 22:23

## 2024-10-24 RX ADMIN — METRONIDAZOLE 200 MILLIGRAM(S): 250 TABLET ORAL at 17:05

## 2024-10-24 RX ADMIN — KETOROLAC TROMETHAMINE 15 MILLIGRAM(S): 30 INJECTION INTRAMUSCULAR; INTRAVENOUS at 22:18

## 2024-10-24 RX ADMIN — Medication 125 MILLILITER(S): at 23:16

## 2024-10-24 RX ADMIN — Medication 50 MICROGRAM(S): at 21:00

## 2024-10-24 RX ADMIN — Medication 0.5 MILLIGRAM(S): at 23:00

## 2024-10-24 RX ADMIN — KETOROLAC TROMETHAMINE 15 MILLIGRAM(S): 30 INJECTION INTRAMUSCULAR; INTRAVENOUS at 22:00

## 2024-10-24 NOTE — BRIEF OPERATIVE NOTE - OPERATION/FINDINGS
15 week sized, multi fibroid uterus. Surgically absent right fimbria and right ovary. Normal left fallopian tube and ovary. TAPS block performed by anesthesia postoperatively. Surgiflo placed over vaginal cuff.

## 2024-10-24 NOTE — DISCHARGE NOTE PROVIDER - NSDCCPTREATMENT_GEN_ALL_CORE_FT
PRINCIPAL PROCEDURE  Procedure: Exploratory laparotomy with total abdominal hysterectomy  Findings and Treatment:       SECONDARY PROCEDURE  Procedure: Left salpingoophorectomy  Findings and Treatment:

## 2024-10-24 NOTE — DISCHARGE NOTE PROVIDER - CARE PROVIDER_API CALL
Ingrid Dotson  Obstetrics and Gynecology  [address] [place] [address]  Phone: [phone]  Fax: [phone]  Follow Up Time: 2 weeks

## 2024-10-24 NOTE — DISCHARGE NOTE PROVIDER - NSDCFUSCHEDAPPT_GEN_ALL_CORE_FT
Ingrid Dotson  [facility]  [facility] [address]  Scheduled Appointment: 11/04/2024

## 2024-10-24 NOTE — BRIEF OPERATIVE NOTE - NSICDXBRIEFPROCEDURE_GEN_ALL_CORE_FT
PROCEDURES:  Exploratory laparotomy with total abdominal hysterectomy 24-Oct-2024 19:00:21  Vanna Murry  Left salpingoophorectomy 24-Oct-2024 19:00:32  Vanna Murry

## 2024-10-24 NOTE — BRIEF OPERATIVE NOTE - NSICDXBRIEFPOSTOP_GEN_ALL_CORE_FT
POST-OP DIAGNOSIS:  Fibroid uterus 24-Oct-2024 19:01:48  Vanna Murry  Postmenopause bleeding 24-Oct-2024 19:00:50  aVnna Murry

## 2024-10-24 NOTE — DISCHARGE NOTE PROVIDER - HOSPITAL COURSE
Patient admitted following total abdominal hysterectomy, left SO. She was transferred from PACU recovery to the floor in stable condition. Her hospital stay was uncomplicated. Upon discharge she was ambulating, voiding, tolerating PO, and her pain was well controlled with prn pain medications.

## 2024-10-24 NOTE — DISCHARGE NOTE PROVIDER - NSDCFUADDINST_GEN_ALL_CORE_FT
- Please call the office for a follow-up with your doctor in 2 weeks.  - Please call the office sooner if you have heavy vaginal bleeding, severe abdominal pain, fever over 100.4F, or are unable to urinate.  - You can take ibuprofen 600mg and tylenol 975mg every 6 hours as needed for pain. Oxycodone should be used for severe pain only. Please do not drive or make important decisions if taking oxycodone.  - No vigorous activity and no lifting objects greater than 10lbs x 6weeks. You may ambulate and climb stairs.  - Nothing per vagina, no tub baths, and no soaking incision sites x 6weeks. You may shower daily, allowing soapy water to run over incision sites; Sutures will dissolve on their own.  - Constipation is a common complaint after surgery and straining should be avoided. Stool softeners (i.e. senna) and mild laxatives (i.e. miralax) can help with bowel regularity.

## 2024-10-24 NOTE — DISCHARGE NOTE PROVIDER - NSDCMRMEDTOKEN_GEN_ALL_CORE_FT
calcium (as carbonate) 600 mg oral tablet: 2 tab(s) orally once a day  calcium-vitamin D: 1 once a day  tamoxifen 20 mg oral tablet: 1 tab(s) orally once a day  Tylenol 325 mg oral tablet: 2 tab(s) orally every 4 hours, As Needed   acetaminophen 500 mg oral tablet: 2 tab(s) orally every 6 hours  calcium (as carbonate) 600 mg oral tablet: 2 tab(s) orally once a day  calcium-vitamin D: 1 once a day  ibuprofen 600 mg oral tablet: 1 tab(s) orally every 6 hours  oxyCODONE 5 mg oral tablet: 1 tab(s) orally every 8 hours MDD: 3  tamoxifen 20 mg oral tablet: 1 tab(s) orally once a day  Tylenol 325 mg oral tablet: 2 tab(s) orally every 4 hours, As Needed

## 2024-10-24 NOTE — BRIEF OPERATIVE NOTE - NSICDXBRIEFPREOP_GEN_ALL_CORE_FT
PRE-OP DIAGNOSIS:  Postmenopause bleeding 24-Oct-2024 19:00:44  Vanna Murry  Fibroid uterus 24-Oct-2024 19:01:31  Vanna Murry

## 2024-10-25 ENCOUNTER — TRANSCRIPTION ENCOUNTER (OUTPATIENT)
Age: 49
End: 2024-10-25

## 2024-10-25 VITALS
HEART RATE: 64 BPM | OXYGEN SATURATION: 97 % | TEMPERATURE: 99 F | SYSTOLIC BLOOD PRESSURE: 113 MMHG | RESPIRATION RATE: 18 BRPM | DIASTOLIC BLOOD PRESSURE: 70 MMHG

## 2024-10-25 LAB
ANION GAP SERPL CALC-SCNC: 11 MMOL/L — SIGNIFICANT CHANGE UP (ref 5–17)
BUN SERPL-MCNC: 11.9 MG/DL — SIGNIFICANT CHANGE UP (ref 8–20)
CALCIUM SERPL-MCNC: 8.6 MG/DL — SIGNIFICANT CHANGE UP (ref 8.4–10.5)
CHLORIDE SERPL-SCNC: 102 MMOL/L — SIGNIFICANT CHANGE UP (ref 96–108)
CO2 SERPL-SCNC: 25 MMOL/L — SIGNIFICANT CHANGE UP (ref 22–29)
CREAT SERPL-MCNC: 0.71 MG/DL — SIGNIFICANT CHANGE UP (ref 0.5–1.3)
EGFR: 104 ML/MIN/1.73M2 — SIGNIFICANT CHANGE UP
GLUCOSE SERPL-MCNC: 129 MG/DL — HIGH (ref 70–99)
HCT VFR BLD CALC: 33.9 % — LOW (ref 34.5–45)
HGB BLD-MCNC: 11.1 G/DL — LOW (ref 11.5–15.5)
MAGNESIUM SERPL-MCNC: 1.9 MG/DL — SIGNIFICANT CHANGE UP (ref 1.6–2.6)
MCHC RBC-ENTMCNC: 30.1 PG — SIGNIFICANT CHANGE UP (ref 27–34)
MCHC RBC-ENTMCNC: 32.7 GM/DL — SIGNIFICANT CHANGE UP (ref 32–36)
MCV RBC AUTO: 91.9 FL — SIGNIFICANT CHANGE UP (ref 80–100)
PHOSPHATE SERPL-MCNC: 3 MG/DL — SIGNIFICANT CHANGE UP (ref 2.4–4.7)
PLATELET # BLD AUTO: 218 K/UL — SIGNIFICANT CHANGE UP (ref 150–400)
POTASSIUM SERPL-MCNC: 4.6 MMOL/L — SIGNIFICANT CHANGE UP (ref 3.5–5.3)
POTASSIUM SERPL-SCNC: 4.6 MMOL/L — SIGNIFICANT CHANGE UP (ref 3.5–5.3)
RBC # BLD: 3.69 M/UL — LOW (ref 3.8–5.2)
RBC # FLD: 13.5 % — SIGNIFICANT CHANGE UP (ref 10.3–14.5)
SODIUM SERPL-SCNC: 138 MMOL/L — SIGNIFICANT CHANGE UP (ref 135–145)
WBC # BLD: 12.57 K/UL — HIGH (ref 3.8–10.5)
WBC # FLD AUTO: 12.57 K/UL — HIGH (ref 3.8–10.5)

## 2024-10-25 PROCEDURE — 83735 ASSAY OF MAGNESIUM: CPT

## 2024-10-25 PROCEDURE — 80048 BASIC METABOLIC PNL TOTAL CA: CPT

## 2024-10-25 PROCEDURE — 36415 COLL VENOUS BLD VENIPUNCTURE: CPT

## 2024-10-25 PROCEDURE — 86850 RBC ANTIBODY SCREEN: CPT

## 2024-10-25 PROCEDURE — 84100 ASSAY OF PHOSPHORUS: CPT

## 2024-10-25 PROCEDURE — C1765: CPT

## 2024-10-25 PROCEDURE — 86901 BLOOD TYPING SEROLOGIC RH(D): CPT

## 2024-10-25 PROCEDURE — 88307 TISSUE EXAM BY PATHOLOGIST: CPT

## 2024-10-25 PROCEDURE — 85027 COMPLETE CBC AUTOMATED: CPT

## 2024-10-25 PROCEDURE — C1889: CPT

## 2024-10-25 PROCEDURE — 86900 BLOOD TYPING SEROLOGIC ABO: CPT

## 2024-10-25 RX ORDER — ACETAMINOPHEN 500 MG
2 TABLET ORAL
Qty: 40 | Refills: 0
Start: 2024-10-25 | End: 2024-10-29

## 2024-10-25 RX ORDER — IBUPROFEN 200 MG
1 TABLET ORAL
Qty: 20 | Refills: 0
Start: 2024-10-25 | End: 2024-10-29

## 2024-10-25 RX ORDER — OXYCODONE HYDROCHLORIDE 30 MG/1
1 TABLET ORAL
Qty: 6 | Refills: 0
Start: 2024-10-25 | End: 2024-10-26

## 2024-10-25 RX ADMIN — Medication 40 MILLIGRAM(S): at 05:44

## 2024-10-25 RX ADMIN — Medication 125 MILLILITER(S): at 05:43

## 2024-10-25 RX ADMIN — Medication 975 MILLIGRAM(S): at 13:12

## 2024-10-25 RX ADMIN — SIMETHICONE 80 MILLIGRAM(S): 80 TABLET, CHEWABLE ORAL at 13:16

## 2024-10-25 RX ADMIN — Medication 975 MILLIGRAM(S): at 05:43

## 2024-10-25 RX ADMIN — OXYCODONE HYDROCHLORIDE 5 MILLIGRAM(S): 30 TABLET ORAL at 13:14

## 2024-10-25 RX ADMIN — Medication 975 MILLIGRAM(S): at 00:08

## 2024-10-25 RX ADMIN — KETOROLAC TROMETHAMINE 15 MILLIGRAM(S): 30 INJECTION INTRAMUSCULAR; INTRAVENOUS at 05:45

## 2024-10-25 RX ADMIN — Medication 125 MILLILITER(S): at 00:08

## 2024-10-25 NOTE — PROGRESS NOTE ADULT - SUBJECTIVE AND OBJECTIVE BOX
BRYON IVEY is a 49y female who is s/p KIRAN- left salpingooophorectomy, partial right salpingectomy and tap block.    SUBJECTIVE:  Patient states pain is well controlled with PRN medication.  Tolerating PO, she denies nausea/vomiting.   She has not yet ambulated. Jaam in place.  Denies flatus.     OBJECTIVE:  T(C): 37.4 (10-25-24 @ 04:56), Max: 37.4 (10-24-24 @ 23:31)  HR: 55 (10-25-24 @ 04:56) (49 - 61)  BP: 98/58 (10-25-24 @ 04:56) (98/58 - 149/91)  RR: 18 (10-25-24 @ 04:56) (12 - 18)  SpO2: 97% (10-25-24 @ 04:56) (93% - 100%)    General: well appearing; no distress  Cardiovascular: regular rate and rhythm, no murmurs, rubs or gallops appreciated  Respiratory: lungs clear to auscultation bilaterally  Abdominal: Appropriately tender to palpation; incisions appear dry and intact with mepilex  Extremities: no redness, tenderness, swelling or warmth on palpation    10-24-24 @ 07:01  -  10-25-24 @ 05:33  --------------------------------------------------------  IN: 615 mL / OUT: 650 mL / NET: -35 mL    MEDICATIONS  (STANDING):  acetaminophen     Tablet .. 975 milliGRAM(s) Oral every 6 hours  enoxaparin Injectable 40 milliGRAM(s) SubCutaneous every 24 hours  ketorolac   Injectable 15 milliGRAM(s) IV Push every 8 hours  lactated ringers. 1000 milliLiter(s) (125 mL/Hr) IV Continuous <Continuous>  tamoxifen 20 milliGRAM(s) Oral daily    MEDICATIONS  (PRN):  acetaminophen     Tablet .. 1000 milliGRAM(s) Oral every 6 hours PRN Mild Pain (1 - 3)  ibuprofen  Tablet. 600 milliGRAM(s) Oral every 6 hours PRN Mild Pain (1 - 3)  ondansetron    Tablet 8 milliGRAM(s) Oral every 8 hours PRN Nausea and/or Vomiting  oxyCODONE    IR 5 milliGRAM(s) Oral every 3 hours PRN Moderate Pain (4 - 6)  oxyCODONE    IR 10 milliGRAM(s) Oral every 4 hours PRN Severe Pain (7 - 10)  polyethylene glycol 3350 17 Gram(s) Oral at bedtime PRN Constipation  simethicone 80 milliGRAM(s) Chew every 6 hours PRN Gas

## 2024-10-25 NOTE — PROGRESS NOTE ADULT - ASSESSMENT
A/P: BRYON IVEY is a 49y female who is s/p KIRAN- left salpingooophorectomy, partial right salpingectomy and tap block.    Neuro: Pain well controlled on current regimen.  Resp: Encourage IS use.  GI: Tolerating PO, continue to advance diet as tolerated. Zofran PRN.  : Jama in place. FU AM TOV.  Heme: FU AM CBC.  Ext: SCDs in place for DVT ppx. Lovenox 40mg in the AM     Dispo: Continue to monitor inpatient  To be discussed with Dr. Dotson

## 2024-10-25 NOTE — DISCHARGE NOTE NURSING/CASE MANAGEMENT/SOCIAL WORK - PATIENT PORTAL LINK FT
You can access the FollowMyHealth Patient Portal offered by [facility] by registering at the following website: [email or web address]. By joining [facility]’s FollowMyHealth portal, you will also be able to view your health information using other applications (apps) compatible with our system.

## 2024-10-25 NOTE — DISCHARGE NOTE NURSING/CASE MANAGEMENT/SOCIAL WORK - FINANCIAL ASSISTANCE
[facility] provides services at a reduced cost to those who are determined to be eligible through [facility]’s financial assistance program. Information regarding [facility]’s financial assistance program can be found by going to [email or web address] or by calling [phone].

## 2024-10-30 LAB — SURGICAL PATHOLOGY STUDY: SIGNIFICANT CHANGE UP

## 2024-11-04 ENCOUNTER — APPOINTMENT (OUTPATIENT)
Dept: OBGYN | Facility: CLINIC | Age: 49
End: 2024-11-04
Payer: COMMERCIAL

## 2024-11-04 VITALS
DIASTOLIC BLOOD PRESSURE: 78 MMHG | WEIGHT: 140 LBS | BODY MASS INDEX: 23.9 KG/M2 | HEIGHT: 64 IN | SYSTOLIC BLOOD PRESSURE: 114 MMHG

## 2024-11-04 DIAGNOSIS — D21.9 BENIGN NEOPLASM OF CONNECTIVE AND OTHER SOFT TISSUE, UNSPECIFIED: ICD-10-CM

## 2024-11-04 PROCEDURE — 99024 POSTOP FOLLOW-UP VISIT: CPT

## 2024-12-02 ENCOUNTER — APPOINTMENT (OUTPATIENT)
Dept: OBGYN | Facility: CLINIC | Age: 49
End: 2024-12-02
Payer: COMMERCIAL

## 2024-12-02 VITALS
BODY MASS INDEX: 23.9 KG/M2 | DIASTOLIC BLOOD PRESSURE: 85 MMHG | WEIGHT: 140 LBS | SYSTOLIC BLOOD PRESSURE: 130 MMHG | HEIGHT: 64 IN

## 2024-12-02 DIAGNOSIS — D21.9 BENIGN NEOPLASM OF CONNECTIVE AND OTHER SOFT TISSUE, UNSPECIFIED: ICD-10-CM

## 2024-12-02 PROCEDURE — 99024 POSTOP FOLLOW-UP VISIT: CPT

## 2024-12-05 ENCOUNTER — TRANSCRIPTION ENCOUNTER (OUTPATIENT)
Age: 49
End: 2024-12-05

## 2025-02-14 ENCOUNTER — OUTPATIENT (OUTPATIENT)
Dept: OUTPATIENT SERVICES | Facility: HOSPITAL | Age: 50
LOS: 1 days | Discharge: HOME | End: 2025-02-14

## 2025-02-14 DIAGNOSIS — Z90.13 ACQUIRED ABSENCE OF BILATERAL BREASTS AND NIPPLES: Chronic | ICD-10-CM

## 2025-02-14 DIAGNOSIS — Z90.721 ACQUIRED ABSENCE OF OVARIES, UNILATERAL: Chronic | ICD-10-CM

## 2025-02-14 DIAGNOSIS — Z98.890 OTHER SPECIFIED POSTPROCEDURAL STATES: Chronic | ICD-10-CM

## 2025-02-18 ENCOUNTER — RESULT REVIEW (OUTPATIENT)
Age: 50
End: 2025-02-18

## 2025-02-18 ENCOUNTER — APPOINTMENT (OUTPATIENT)
Dept: HEMATOLOGY ONCOLOGY | Facility: CLINIC | Age: 50
End: 2025-02-18
Payer: COMMERCIAL

## 2025-02-18 VITALS
BODY MASS INDEX: 24.41 KG/M2 | HEART RATE: 51 BPM | OXYGEN SATURATION: 100 % | SYSTOLIC BLOOD PRESSURE: 143 MMHG | DIASTOLIC BLOOD PRESSURE: 82 MMHG | WEIGHT: 143 LBS | HEIGHT: 64 IN

## 2025-02-18 DIAGNOSIS — C50.911 MALIGNANT NEOPLASM OF UNSPECIFIED SITE OF RIGHT FEMALE BREAST: ICD-10-CM

## 2025-02-18 DIAGNOSIS — Z79.810 LONG TERM (CURRENT) USE OF SELECTIVE ESTROGEN RECEPTOR MODULATORS (SERMS): ICD-10-CM

## 2025-02-18 DIAGNOSIS — Z85.3 PERSONAL HISTORY OF MALIGNANT NEOPLASM OF BREAST: ICD-10-CM

## 2025-02-18 LAB
BASOPHILS # BLD AUTO: 0.05 K/UL — SIGNIFICANT CHANGE UP (ref 0–0.2)
BASOPHILS NFR BLD AUTO: 0.8 % — SIGNIFICANT CHANGE UP (ref 0–2)
EOSINOPHIL # BLD AUTO: 0.08 K/UL — SIGNIFICANT CHANGE UP (ref 0–0.5)
EOSINOPHIL NFR BLD AUTO: 1.4 % — SIGNIFICANT CHANGE UP (ref 0–6)
HCT VFR BLD CALC: 36.7 % — SIGNIFICANT CHANGE UP (ref 34.5–45)
HGB BLD-MCNC: 12.2 G/DL — SIGNIFICANT CHANGE UP (ref 11.5–15.5)
IMM GRANULOCYTES # BLD AUTO: 0.01 K/UL — SIGNIFICANT CHANGE UP (ref 0–0.07)
IMM GRANULOCYTES NFR BLD AUTO: 0.2 % — SIGNIFICANT CHANGE UP (ref 0–0.9)
LYMPHOCYTES # BLD AUTO: 2.2 K/UL — SIGNIFICANT CHANGE UP (ref 1–3.3)
LYMPHOCYTES NFR BLD AUTO: 37.2 % — SIGNIFICANT CHANGE UP (ref 13–44)
MCHC RBC-ENTMCNC: 30.5 PG — SIGNIFICANT CHANGE UP (ref 27–34)
MCHC RBC-ENTMCNC: 33.2 G/DL — SIGNIFICANT CHANGE UP (ref 32–36)
MCV RBC AUTO: 91.8 FL — SIGNIFICANT CHANGE UP (ref 80–100)
MONOCYTES # BLD AUTO: 0.32 K/UL — SIGNIFICANT CHANGE UP (ref 0–0.9)
MONOCYTES NFR BLD AUTO: 5.4 % — SIGNIFICANT CHANGE UP (ref 2–14)
NEUTROPHILS # BLD AUTO: 3.26 K/UL — SIGNIFICANT CHANGE UP (ref 1.8–7.4)
NEUTROPHILS NFR BLD AUTO: 55 % — SIGNIFICANT CHANGE UP (ref 43–77)
NRBC # BLD AUTO: 0 K/UL — SIGNIFICANT CHANGE UP (ref 0–0)
NRBC # FLD: 0 K/UL — SIGNIFICANT CHANGE UP (ref 0–0)
NRBC BLD AUTO-RTO: 0 /100 WBCS — SIGNIFICANT CHANGE UP (ref 0–0)
PLATELET # BLD AUTO: 245 K/UL — SIGNIFICANT CHANGE UP (ref 150–400)
PMV BLD: 12.3 FL — SIGNIFICANT CHANGE UP (ref 7–13)
RBC # BLD: 4 M/UL — SIGNIFICANT CHANGE UP (ref 3.8–5.2)
RBC # FLD: 13.8 % — SIGNIFICANT CHANGE UP (ref 10.3–14.5)
WBC # BLD: 5.92 K/UL — SIGNIFICANT CHANGE UP (ref 3.8–10.5)
WBC # FLD AUTO: 5.92 K/UL — SIGNIFICANT CHANGE UP (ref 3.8–10.5)

## 2025-02-18 PROCEDURE — 99214 OFFICE O/P EST MOD 30 MIN: CPT

## 2025-02-18 RX ORDER — CALCIUM CARBONATE 500(1250)
500 TABLET ORAL
Refills: 0 | Status: ACTIVE | COMMUNITY

## 2025-02-18 RX ORDER — MULTIVIT-MIN/IRON/FOLIC ACID/K 18-600-40
CAPSULE ORAL
Refills: 0 | Status: ACTIVE | COMMUNITY

## 2025-02-19 ENCOUNTER — NON-APPOINTMENT (OUTPATIENT)
Age: 50
End: 2025-02-19

## 2025-02-19 LAB
ALBUMIN SERPL ELPH-MCNC: 4.4 G/DL
ALP BLD-CCNC: 69 U/L
ALT SERPL-CCNC: 15 U/L
ANION GAP SERPL CALC-SCNC: 10 MMOL/L
AST SERPL-CCNC: 29 U/L
BILIRUB SERPL-MCNC: 0.2 MG/DL
BUN SERPL-MCNC: 21 MG/DL
CALCIUM SERPL-MCNC: 9.3 MG/DL
CHLORIDE SERPL-SCNC: 104 MMOL/L
CO2 SERPL-SCNC: 27 MMOL/L
CREAT SERPL-MCNC: 0.73 MG/DL
EGFR: 100 ML/MIN/1.73M2
GLUCOSE SERPL-MCNC: 91 MG/DL
POTASSIUM SERPL-SCNC: 4.5 MMOL/L
PROT SERPL-MCNC: 6.4 G/DL
SODIUM SERPL-SCNC: 141 MMOL/L

## 2025-03-11 ENCOUNTER — APPOINTMENT (OUTPATIENT)
Dept: DERMATOLOGY | Facility: CLINIC | Age: 50
End: 2025-03-11

## 2025-03-24 ENCOUNTER — APPOINTMENT (OUTPATIENT)
Dept: SURGERY | Facility: CLINIC | Age: 50
End: 2025-03-24

## 2025-03-29 ENCOUNTER — EMERGENCY (EMERGENCY)
Facility: HOSPITAL | Age: 50
LOS: 1 days | End: 2025-03-29
Attending: EMERGENCY MEDICINE
Payer: COMMERCIAL

## 2025-03-29 VITALS
OXYGEN SATURATION: 100 % | DIASTOLIC BLOOD PRESSURE: 71 MMHG | HEIGHT: 64 IN | TEMPERATURE: 98 F | WEIGHT: 145.06 LBS | SYSTOLIC BLOOD PRESSURE: 109 MMHG | HEART RATE: 54 BPM | RESPIRATION RATE: 20 BRPM

## 2025-03-29 DIAGNOSIS — Z90.721 ACQUIRED ABSENCE OF OVARIES, UNILATERAL: Chronic | ICD-10-CM

## 2025-03-29 DIAGNOSIS — Z98.890 OTHER SPECIFIED POSTPROCEDURAL STATES: Chronic | ICD-10-CM

## 2025-03-29 DIAGNOSIS — Z90.13 ACQUIRED ABSENCE OF BILATERAL BREASTS AND NIPPLES: Chronic | ICD-10-CM

## 2025-03-29 LAB
ALBUMIN SERPL ELPH-MCNC: 4.1 G/DL — SIGNIFICANT CHANGE UP (ref 3.3–5.2)
ALP SERPL-CCNC: 60 U/L — SIGNIFICANT CHANGE UP (ref 40–120)
ALT FLD-CCNC: 14 U/L — SIGNIFICANT CHANGE UP
ANION GAP SERPL CALC-SCNC: 10 MMOL/L — SIGNIFICANT CHANGE UP (ref 5–17)
APPEARANCE UR: CLEAR — SIGNIFICANT CHANGE UP
AST SERPL-CCNC: 20 U/L — SIGNIFICANT CHANGE UP
BACTERIA # UR AUTO: ABNORMAL /HPF
BASOPHILS # BLD AUTO: 0.05 K/UL — SIGNIFICANT CHANGE UP (ref 0–0.2)
BASOPHILS NFR BLD AUTO: 0.7 % — SIGNIFICANT CHANGE UP (ref 0–2)
BILIRUB SERPL-MCNC: 0.3 MG/DL — LOW (ref 0.4–2)
BUN SERPL-MCNC: 18.6 MG/DL — SIGNIFICANT CHANGE UP (ref 8–20)
CALCIUM SERPL-MCNC: 9.3 MG/DL — SIGNIFICANT CHANGE UP (ref 8.4–10.5)
CAST: 1 /LPF — SIGNIFICANT CHANGE UP (ref 0–4)
CHLORIDE SERPL-SCNC: 104 MMOL/L — SIGNIFICANT CHANGE UP (ref 96–108)
CO2 SERPL-SCNC: 27 MMOL/L — SIGNIFICANT CHANGE UP (ref 22–29)
COLOR SPEC: YELLOW — SIGNIFICANT CHANGE UP
CREAT SERPL-MCNC: 0.83 MG/DL — SIGNIFICANT CHANGE UP (ref 0.5–1.3)
DIFF PNL FLD: NEGATIVE — SIGNIFICANT CHANGE UP
EGFR: 86 ML/MIN/1.73M2 — SIGNIFICANT CHANGE UP
EGFR: 86 ML/MIN/1.73M2 — SIGNIFICANT CHANGE UP
EOSINOPHIL # BLD AUTO: 0.07 K/UL — SIGNIFICANT CHANGE UP (ref 0–0.5)
EOSINOPHIL NFR BLD AUTO: 1 % — SIGNIFICANT CHANGE UP (ref 0–6)
GLUCOSE SERPL-MCNC: 94 MG/DL — SIGNIFICANT CHANGE UP (ref 70–99)
GLUCOSE UR QL: NEGATIVE MG/DL — SIGNIFICANT CHANGE UP
HGB BLD-MCNC: 12.6 G/DL — SIGNIFICANT CHANGE UP (ref 11.5–15.5)
IMM GRANULOCYTES # BLD AUTO: 0.02 K/UL — SIGNIFICANT CHANGE UP (ref 0–0.07)
IMM GRANULOCYTES NFR BLD AUTO: 0.3 % — SIGNIFICANT CHANGE UP (ref 0–0.9)
KETONES UR-MCNC: NEGATIVE MG/DL — SIGNIFICANT CHANGE UP
LEUKOCYTE ESTERASE UR-ACNC: ABNORMAL
LIDOCAIN IGE QN: 34 U/L — SIGNIFICANT CHANGE UP (ref 22–51)
LYMPHOCYTES # BLD AUTO: 1.17 K/UL — SIGNIFICANT CHANGE UP (ref 1–3.3)
LYMPHOCYTES NFR BLD AUTO: 16.2 % — SIGNIFICANT CHANGE UP (ref 13–44)
MCHC RBC-ENTMCNC: 29.9 PG — SIGNIFICANT CHANGE UP (ref 27–34)
MCHC RBC-ENTMCNC: 32.7 G/DL — SIGNIFICANT CHANGE UP (ref 32–36)
MCV RBC AUTO: 91.2 FL — SIGNIFICANT CHANGE UP (ref 80–100)
MONOCYTES # BLD AUTO: 0.3 K/UL — SIGNIFICANT CHANGE UP (ref 0–0.9)
MONOCYTES NFR BLD AUTO: 4.2 % — SIGNIFICANT CHANGE UP (ref 2–14)
NEUTROPHILS # BLD AUTO: 5.61 K/UL — SIGNIFICANT CHANGE UP (ref 1.8–7.4)
NEUTROPHILS NFR BLD AUTO: 77.6 % — HIGH (ref 43–77)
NITRITE UR-MCNC: NEGATIVE — SIGNIFICANT CHANGE UP
NRBC # BLD AUTO: 0 K/UL — SIGNIFICANT CHANGE UP (ref 0–0)
NRBC # FLD: 0 K/UL — SIGNIFICANT CHANGE UP (ref 0–0)
NRBC BLD AUTO-RTO: 0 /100 WBCS — SIGNIFICANT CHANGE UP (ref 0–0)
PH UR: 6 — SIGNIFICANT CHANGE UP (ref 5–8)
PLATELET # BLD AUTO: 201 K/UL — SIGNIFICANT CHANGE UP (ref 150–400)
PMV BLD: 11.4 FL — SIGNIFICANT CHANGE UP (ref 7–13)
POTASSIUM SERPL-MCNC: 4.1 MMOL/L — SIGNIFICANT CHANGE UP (ref 3.5–5.3)
POTASSIUM SERPL-SCNC: 4.1 MMOL/L — SIGNIFICANT CHANGE UP (ref 3.5–5.3)
PROT SERPL-MCNC: 6.1 G/DL — LOW (ref 6.6–8.7)
PROT UR-MCNC: NEGATIVE MG/DL — SIGNIFICANT CHANGE UP
RBC # BLD: 4.22 M/UL — SIGNIFICANT CHANGE UP (ref 3.8–5.2)
RBC # FLD: 13.4 % — SIGNIFICANT CHANGE UP (ref 10.3–14.5)
SODIUM SERPL-SCNC: 141 MMOL/L — SIGNIFICANT CHANGE UP (ref 135–145)
SQUAMOUS # UR AUTO: 3 /HPF — SIGNIFICANT CHANGE UP (ref 0–5)
UROBILINOGEN FLD QL: 1 MG/DL — SIGNIFICANT CHANGE UP (ref 0.2–1)
WBC # BLD: 7.22 K/UL — SIGNIFICANT CHANGE UP (ref 3.8–10.5)
WBC # FLD AUTO: 7.22 K/UL — SIGNIFICANT CHANGE UP (ref 3.8–10.5)
WBC UR QL: 10 /HPF — HIGH (ref 0–5)

## 2025-03-29 PROCEDURE — 99285 EMERGENCY DEPT VISIT HI MDM: CPT

## 2025-03-29 PROCEDURE — 36415 COLL VENOUS BLD VENIPUNCTURE: CPT

## 2025-03-29 PROCEDURE — 83690 ASSAY OF LIPASE: CPT

## 2025-03-29 PROCEDURE — 85025 COMPLETE CBC W/AUTO DIFF WBC: CPT

## 2025-03-29 PROCEDURE — 81001 URINALYSIS AUTO W/SCOPE: CPT

## 2025-03-29 PROCEDURE — 74177 CT ABD & PELVIS W/CONTRAST: CPT | Mod: MC

## 2025-03-29 PROCEDURE — 74177 CT ABD & PELVIS W/CONTRAST: CPT | Mod: 26

## 2025-03-29 PROCEDURE — 99284 EMERGENCY DEPT VISIT MOD MDM: CPT | Mod: 25

## 2025-03-29 PROCEDURE — 96375 TX/PRO/DX INJ NEW DRUG ADDON: CPT

## 2025-03-29 PROCEDURE — 80053 COMPREHEN METABOLIC PANEL: CPT

## 2025-03-29 PROCEDURE — 96374 THER/PROPH/DIAG INJ IV PUSH: CPT | Mod: XU

## 2025-03-29 RX ORDER — ONDANSETRON HCL/PF 4 MG/2 ML
4 VIAL (ML) INJECTION ONCE
Refills: 0 | Status: COMPLETED | OUTPATIENT
Start: 2025-03-29 | End: 2025-03-29

## 2025-03-29 RX ORDER — CEPHALEXIN 250 MG/1
1 CAPSULE ORAL
Qty: 15 | Refills: 0
Start: 2025-03-29 | End: 2025-04-02

## 2025-03-29 RX ORDER — ONDANSETRON HCL/PF 4 MG/2 ML
1 VIAL (ML) INJECTION
Qty: 12 | Refills: 0
Start: 2025-03-29 | End: 2025-04-01

## 2025-03-29 RX ADMIN — Medication 4 MILLIGRAM(S): at 08:55

## 2025-03-29 RX ADMIN — Medication 1000 MILLILITER(S): at 08:54

## 2025-03-29 RX ADMIN — Medication 4 MILLIGRAM(S): at 08:54

## 2025-03-29 NOTE — ED PROVIDER NOTE - OBJECTIVE STATEMENT
50-year-old female status post hysterectomy comes to the ED with acute onset of lower abdominal pain since this morning.  Patient noted vomiting bilious fluid.  Patient denies any fever diarrhea or urinary symptoms.

## 2025-03-29 NOTE — ED PROVIDER NOTE - CLINICAL SUMMARY MEDICAL DECISION MAKING FREE TEXT BOX
50-year-old female status post hysterectomy with right lower quadrant pain will evaluate for obstruction or appendicitis; labs lipase urinalysis CT abdomen IV contrast

## 2025-03-29 NOTE — ED ADULT TRIAGE NOTE - CHIEF COMPLAINT QUOTE
" I have been having chills, vomiting RLQ pain and a feeling like I have to pop starting this morning" as per family pt was sweating when she woke up and her vomt was yellow.

## 2025-03-29 NOTE — ED PROVIDER NOTE - NSFOLLOWUPINSTRUCTIONS_ED_ALL_ED_FT
zofran 4mg by mouth every 8 hours for nausea  start keflex 500mg by mouth 3 times  day  for 5 days with symptoms of urinary tract infection; pain on urination, frequency , blood in urine; , fever

## 2025-03-29 NOTE — ED ADULT NURSE NOTE - OBJECTIVE STATEMENT
patient with lower right quadrant abdominal pain radiating to rectum. pt denies nausea/vomiting. hx of hysterectomy.

## 2025-03-29 NOTE — ED PROVIDER NOTE - WET READ LAUNCH FT
Returned pt's call from earlier today. Pt reported that she had increased pain in the shoulder after last session. That's why she has cacelled her last few appts. She goes to [facility] on March 2 and is going to have an injection.Plans to return to PT after that, when shoulder is feeling better.    There are no Wet Read(s) to document.

## 2025-03-31 ENCOUNTER — TRANSCRIPTION ENCOUNTER (OUTPATIENT)
Age: 50
End: 2025-03-31

## 2025-03-31 ENCOUNTER — NON-APPOINTMENT (OUTPATIENT)
Age: 50
End: 2025-03-31

## 2025-04-15 ENCOUNTER — ASOB RESULT (OUTPATIENT)
Age: 50
End: 2025-04-15

## 2025-04-15 ENCOUNTER — APPOINTMENT (OUTPATIENT)
Dept: ANTEPARTUM | Facility: CLINIC | Age: 50
End: 2025-04-15
Payer: COMMERCIAL

## 2025-04-15 PROCEDURE — 76856 US EXAM PELVIC COMPLETE: CPT | Mod: 59

## 2025-04-15 PROCEDURE — 76830 TRANSVAGINAL US NON-OB: CPT

## 2025-04-16 ENCOUNTER — TRANSCRIPTION ENCOUNTER (OUTPATIENT)
Age: 50
End: 2025-04-16

## 2025-04-28 ENCOUNTER — APPOINTMENT (OUTPATIENT)
Dept: OBGYN | Facility: CLINIC | Age: 50
End: 2025-04-28
Payer: COMMERCIAL

## 2025-04-28 VITALS
BODY MASS INDEX: 24.75 KG/M2 | DIASTOLIC BLOOD PRESSURE: 88 MMHG | HEIGHT: 64 IN | SYSTOLIC BLOOD PRESSURE: 118 MMHG | WEIGHT: 145 LBS

## 2025-04-28 DIAGNOSIS — Z00.00 ENCOUNTER FOR GENERAL ADULT MEDICAL EXAMINATION W/OUT ABNORMAL FINDINGS: ICD-10-CM

## 2025-04-28 PROCEDURE — 99396 PREV VISIT EST AGE 40-64: CPT

## 2025-04-29 LAB — HPV HIGH+LOW RISK DNA PNL CVX: NOT DETECTED

## 2025-05-04 ENCOUNTER — NON-APPOINTMENT (OUTPATIENT)
Age: 50
End: 2025-05-04

## 2025-05-05 LAB — CYTOLOGY CVX/VAG DOC THIN PREP: NORMAL

## 2025-08-18 ENCOUNTER — APPOINTMENT (OUTPATIENT)
Dept: HEMATOLOGY ONCOLOGY | Facility: CLINIC | Age: 50
End: 2025-08-18
Payer: COMMERCIAL

## 2025-08-18 VITALS
TEMPERATURE: 98 F | HEIGHT: 64 IN | BODY MASS INDEX: 25.67 KG/M2 | OXYGEN SATURATION: 98 % | DIASTOLIC BLOOD PRESSURE: 91 MMHG | WEIGHT: 150.36 LBS | HEART RATE: 50 BPM | SYSTOLIC BLOOD PRESSURE: 149 MMHG

## 2025-08-18 DIAGNOSIS — C50.911 MALIGNANT NEOPLASM OF UNSPECIFIED SITE OF RIGHT FEMALE BREAST: ICD-10-CM

## 2025-08-18 DIAGNOSIS — Z79.810 LONG TERM (CURRENT) USE OF SELECTIVE ESTROGEN RECEPTOR MODULATORS (SERMS): ICD-10-CM

## 2025-08-18 PROCEDURE — 99214 OFFICE O/P EST MOD 30 MIN: CPT

## 2025-08-18 PROCEDURE — G2211 COMPLEX E/M VISIT ADD ON: CPT | Mod: NC

## 2025-08-18 RX ORDER — ONDANSETRON 4 MG/1
4 TABLET ORAL
Qty: 12 | Refills: 0 | Status: DISCONTINUED | COMMUNITY
Start: 2025-03-29

## (undated) DEVICE — ELCTR GROUNDING PAD ADULT COVIDIEN

## (undated) DEVICE — CONTAINER SPECIMEN 100ML

## (undated) DEVICE — GLV 7 PROTEXIS (WHITE)

## (undated) DEVICE — DRAPE INSTRUMENT POUCH 6.75" X 11"

## (undated) DEVICE — DRSG TEGADERM 6"X8"

## (undated) DEVICE — STERIS DEFENDO 3-PIECE KIT (AIR/WATER, SUCTION & BIOPSY VALVES)

## (undated) DEVICE — DRSG BIOPATCH DISK W CHG 1" W 7.0MM HOLE

## (undated) DEVICE — SPONGE LAP X-RAY DETECTABLE 18X18"

## (undated) DEVICE — Device

## (undated) DEVICE — DRSG 4 X 8

## (undated) DEVICE — DRAPE CAMERA VIDEO 7"X96"

## (undated) DEVICE — SSH-ERBE 26004501: Type: DURABLE MEDICAL EQUIPMENT

## (undated) DEVICE — DRSG ADHESIVE PRIMER MULTIPURPOSE 3M

## (undated) DEVICE — DRAPE MAJOR ABDOMINAL W POUCHES

## (undated) DEVICE — DRAPE MAYO STAND 30"

## (undated) DEVICE — BLANKET WARMER FULL UNDERBODY

## (undated) DEVICE — SUT QUILL MONODERM 2-0 30CM 18MM

## (undated) DEVICE — PREP TRAY DRY SKIN PREP SCRUB

## (undated) DEVICE — SUT QUILL MONODERM 3-0 30CM PS-2

## (undated) DEVICE — SUT VICRYL 2-0 27" SH UNDYED

## (undated) DEVICE — DRAPE 3/4 SHEET W REINFORCEMENT 56X77"

## (undated) DEVICE — SENS SMALL PATCH FBR OPTC

## (undated) DEVICE — DRAPE SPLIT SHEETS 77X108"

## (undated) DEVICE — SOL IRR POUR NS 0.9% 500ML

## (undated) DEVICE — DRSG KLING 6"

## (undated) DEVICE — SUT MONOCRYL 4-0 27" PS-2 UNDYED

## (undated) DEVICE — TUBING SUCTION CONN 1/4" X 10FT

## (undated) DEVICE — ELCTR BOVIE HANDPIECE PENCIL

## (undated) DEVICE — POSITIONER FOAM EGG CRATE ULNAR (2PCS)

## (undated) DEVICE — VENODYNE/SCD SLEEVE CALF MEDIUM

## (undated) DEVICE — SUT PDS II 3-0 27" SH

## (undated) DEVICE — SYR LUER LOK 3CC

## (undated) DEVICE — DRAPE HANDLE COVER

## (undated) DEVICE — GLV 7.5 PROTEXIS

## (undated) DEVICE — INSTR LIGHTED RETRACTOR LARGE DISP

## (undated) DEVICE — BASIN SPECIAL PROCEDURE

## (undated) DEVICE — DRAPE TOWEL BLUE 17" X 24"

## (undated) DEVICE — DRAPE 3/4 SHEET 52X76"

## (undated) DEVICE — SUT POLYSORB 0 36" GS-21 UNDYED

## (undated) DEVICE — STAPLER SKIN VISI-STAT 35 WIDE

## (undated) DEVICE — MEDICATION LABELS W MARKER

## (undated) DEVICE — NEEDLE COUNTER  FOAM AND MAGNET 40-70

## (undated) DEVICE — DRAPE 1/2 SHEET 40X57"

## (undated) DEVICE — SUT DERMABOND 0.7ML

## (undated) DEVICE — FOLEY TRAY 16FR 5CC LF UMETER CLOSED

## (undated) DEVICE — GOWN XL W TOWEL

## (undated) DEVICE — SUT QUILL PDO 2 30CM 36MM

## (undated) DEVICE — MARKER SKIN MULTI TIP 6"

## (undated) DEVICE — FOLEY TRAY 16FR 5CC LTX UMETER CLOSED

## (undated) DEVICE — TUBING SUCTION 20FT

## (undated) DEVICE — LONE STAR ELASTIC STAYS 12MM BLUNT

## (undated) DEVICE — PREP CHLORAPREP ORANGE 2PCT 26ML

## (undated) DEVICE — SUT DERMABOND PRINEO 60CM

## (undated) DEVICE — SUT PROLENE 5-0 36" RB-1

## (undated) DEVICE — BAG DECANTER IV STERILE

## (undated) DEVICE — PACK BASIC DISP

## (undated) DEVICE — SUT VICRYL 0 27" CT-1

## (undated) DEVICE — GOWN TRIMAX LG

## (undated) DEVICE — WARMING BLANKET UPPER ADULT

## (undated) DEVICE — SYR LUER LOK 10CC

## (undated) DEVICE — LIGASURE ATLAS 10MM 20CM

## (undated) DEVICE — SUT VICRYL 1 36" CT-1 UNDYED

## (undated) DEVICE — SUT MONOCRYL 3-0 27" SH

## (undated) DEVICE — TUBING TRUWAVE PRESSURE MALE/FEMALE 12"

## (undated) DEVICE — PREP CHLORAPREP HI-LITE ORANGE 26ML

## (undated) DEVICE — PACK BASIC

## (undated) DEVICE — DRAIN BLAKE #19

## (undated) DEVICE — SUT NYLON 9-0 5" BV130-5

## (undated) DEVICE — DRAPE IOBAN 23X23"

## (undated) DEVICE — MERCIAN VISABILITY BACKROUND YELLOW

## (undated) DEVICE — NDL EPIDRL TUOH/WEISS 17GX5IN

## (undated) DEVICE — CLAMP MICROVASCULAR DOUBLE  1-2MM

## (undated) DEVICE — SUCTION YANKAUER TAPERED BULBOUS NO VENT

## (undated) DEVICE — DRAIN RESERVOIR FOR JACKSON PRATT 100CC CARDINAL

## (undated) DEVICE — PREP DYNA-HEX CHG 4% 4OZ BOTTLE (BACTOSHIELD)

## (undated) DEVICE — STAPLER SKIN PROXIMATE

## (undated) DEVICE — SPONGE PATTY X-RAY 0.5X3"

## (undated) DEVICE — PACK MINOR

## (undated) DEVICE — SOL IRR POUR H2O 250ML

## (undated) DEVICE — DRSG TELFA 3 X 4

## (undated) DEVICE — PACK MAJOR ABDOMINAL WITH LAP

## (undated) DEVICE — FOLEY HOLDER STATLOCK 2 WAY ADULT

## (undated) DEVICE — ELCTR BIPOLAR CORD J&J 12FT DISP

## (undated) DEVICE — DRSG STERISTRIPS 0.5X4"

## (undated) DEVICE — CLAMP MICROVASCULAR SINGLE  1-2MM

## (undated) DEVICE — DRAPE HALF SHEET 40X57"

## (undated) DEVICE — DRAPE FLUID WARMER 44 X 44"

## (undated) DEVICE — SUT SILK 2-0 18" FS

## (undated) DEVICE — DRAPE MAGNETIC INSTRUMENT MEDIUM

## (undated) DEVICE — SPEAR SURG EYE WECK-CELL CELOS

## (undated) DEVICE — DRAPE FLUID WARMER 44X44"

## (undated) DEVICE — BLADE SAFETY LOCK #10

## (undated) DEVICE — BLADE SAFETY LOCK #15

## (undated) DEVICE — WRAP COMPRESSION CALF LG

## (undated) DEVICE — SUCTION YANKAUER NO CONTROL VENT

## (undated) DEVICE — SYR ASEPTO

## (undated) DEVICE — ELCTR PENCIL NEPTUNE SMOKE EVACUATION

## (undated) DEVICE — BLANKET WARMER LOWER ADULT

## (undated) DEVICE — DOPPLER PROBE  CABLE

## (undated) DEVICE — SUT PDS II 0 36" CT-1

## (undated) DEVICE — GLV 7 PROTEXIS